# Patient Record
Sex: MALE | NOT HISPANIC OR LATINO | URBAN - METROPOLITAN AREA
[De-identification: names, ages, dates, MRNs, and addresses within clinical notes are randomized per-mention and may not be internally consistent; named-entity substitution may affect disease eponyms.]

---

## 2017-12-22 ENCOUNTER — INPATIENT (INPATIENT)
Facility: HOSPITAL | Age: 22
LOS: 1 days | Discharge: HOME | DRG: 810 | End: 2017-12-24
Attending: HOSPITALIST | Admitting: HOSPITALIST
Payer: COMMERCIAL

## 2017-12-22 VITALS
HEART RATE: 102 BPM | OXYGEN SATURATION: 98 % | SYSTOLIC BLOOD PRESSURE: 137 MMHG | DIASTOLIC BLOOD PRESSURE: 81 MMHG | TEMPERATURE: 100 F | WEIGHT: 222.01 LBS | RESPIRATION RATE: 20 BRPM

## 2017-12-22 DIAGNOSIS — D59.1 OTHER AUTOIMMUNE HEMOLYTIC ANEMIAS: ICD-10-CM

## 2017-12-22 DIAGNOSIS — Z29.9 ENCOUNTER FOR PROPHYLACTIC MEASURES, UNSPECIFIED: ICD-10-CM

## 2017-12-22 DIAGNOSIS — D58.9 HEREDITARY HEMOLYTIC ANEMIA, UNSPECIFIED: ICD-10-CM

## 2017-12-22 LAB
ALBUMIN SERPL ELPH-MCNC: 4.8 G/DL — SIGNIFICANT CHANGE UP (ref 3.3–5)
ALP SERPL-CCNC: 68 U/L — SIGNIFICANT CHANGE UP (ref 40–120)
ALT FLD-CCNC: 43 U/L RC — SIGNIFICANT CHANGE UP (ref 10–45)
ANION GAP SERPL CALC-SCNC: 11 MMOL/L — SIGNIFICANT CHANGE UP (ref 5–17)
ANISOCYTOSIS BLD QL: SIGNIFICANT CHANGE UP
ANTIBODY ID 1_1: SIGNIFICANT CHANGE UP
AST SERPL-CCNC: 63 U/L — HIGH (ref 10–40)
BASOPHILS # BLD AUTO: 0.1 K/UL — SIGNIFICANT CHANGE UP (ref 0–0.2)
BILIRUB DIRECT SERPL-MCNC: 0.3 MG/DL — HIGH (ref 0–0.2)
BILIRUB INDIRECT FLD-MCNC: 4.6 MG/DL — HIGH (ref 0.2–1)
BILIRUB SERPL-MCNC: 4.9 MG/DL — HIGH (ref 0.2–1.2)
BILIRUB SERPL-MCNC: 4.9 MG/DL — HIGH (ref 0.2–1.2)
BLD GP AB SCN SERPL QL: POSITIVE — SIGNIFICANT CHANGE UP
BLD GP AB SCN SERPL QL: POSITIVE — SIGNIFICANT CHANGE UP
BUN SERPL-MCNC: 16 MG/DL — SIGNIFICANT CHANGE UP (ref 7–23)
CALCIUM SERPL-MCNC: 9 MG/DL — SIGNIFICANT CHANGE UP (ref 8.4–10.5)
CHLORIDE SERPL-SCNC: 104 MMOL/L — SIGNIFICANT CHANGE UP (ref 96–108)
CO2 SERPL-SCNC: 26 MMOL/L — SIGNIFICANT CHANGE UP (ref 22–31)
CREAT SERPL-MCNC: 1.13 MG/DL — SIGNIFICANT CHANGE UP (ref 0.5–1.3)
EOSINOPHIL # BLD AUTO: 0.3 K/UL — SIGNIFICANT CHANGE UP (ref 0–0.5)
EOSINOPHIL NFR BLD AUTO: 3 % — SIGNIFICANT CHANGE UP (ref 0–6)
FOLATE SERPL-MCNC: >20 NG/ML — SIGNIFICANT CHANGE UP (ref 4.8–24.2)
GIANT PLATELETS BLD QL SMEAR: PRESENT — SIGNIFICANT CHANGE UP
GLUCOSE SERPL-MCNC: 108 MG/DL — HIGH (ref 70–99)
HAPTOGLOB SERPL-MCNC: <20 MG/DL — LOW (ref 34–200)
HCT VFR BLD CALC: 18.5 % — CRITICAL LOW (ref 39–50)
HGB BLD-MCNC: 6.6 G/DL — CRITICAL LOW (ref 13–17)
HYPOCHROMIA BLD QL: SLIGHT — SIGNIFICANT CHANGE UP
LDH SERPL L TO P-CCNC: 827 U/L — HIGH (ref 50–242)
LG PLATELETS BLD QL AUTO: SLIGHT — SIGNIFICANT CHANGE UP
LYMPHOCYTES # BLD AUTO: 16 % — SIGNIFICANT CHANGE UP (ref 13–44)
LYMPHOCYTES # BLD AUTO: 2.1 K/UL — SIGNIFICANT CHANGE UP (ref 1–3.3)
MACROCYTES BLD QL: SLIGHT — SIGNIFICANT CHANGE UP
MCHC RBC-ENTMCNC: 35.6 GM/DL — SIGNIFICANT CHANGE UP (ref 32–36)
MCHC RBC-ENTMCNC: 36.6 PG — HIGH (ref 27–34)
MCV RBC AUTO: 103 FL — HIGH (ref 80–100)
MICROCYTES BLD QL: SLIGHT — SIGNIFICANT CHANGE UP
MONOCYTES # BLD AUTO: 0.7 K/UL — SIGNIFICANT CHANGE UP (ref 0–0.9)
MONOCYTES NFR BLD AUTO: 5 % — SIGNIFICANT CHANGE UP (ref 2–14)
NEUTROPHILS # BLD AUTO: 9.3 K/UL — HIGH (ref 1.8–7.4)
NEUTROPHILS NFR BLD AUTO: 76 % — SIGNIFICANT CHANGE UP (ref 43–77)
NRBC # BLD: 5 /100 — HIGH (ref 0–0)
PLAT MORPH BLD: ABNORMAL
PLATELET # BLD AUTO: 252 K/UL — SIGNIFICANT CHANGE UP (ref 150–400)
POLYCHROMASIA BLD QL SMEAR: SIGNIFICANT CHANGE UP
POTASSIUM SERPL-MCNC: 3.9 MMOL/L — SIGNIFICANT CHANGE UP (ref 3.5–5.3)
POTASSIUM SERPL-SCNC: 3.9 MMOL/L — SIGNIFICANT CHANGE UP (ref 3.5–5.3)
PROT SERPL-MCNC: 7.4 G/DL — SIGNIFICANT CHANGE UP (ref 6–8.3)
RBC # BLD: 1.8 M/UL — LOW (ref 4.2–5.8)
RBC # BLD: 1.8 M/UL — LOW (ref 4.2–5.8)
RBC # FLD: 23.8 % — HIGH (ref 10.3–14.5)
RBC BLD AUTO: ABNORMAL
RETICS #: 825 K/UL — HIGH (ref 25–125)
RETICS/RBC NFR: 45.8 % — HIGH (ref 0.5–2.5)
RH IG SCN BLD-IMP: NEGATIVE — SIGNIFICANT CHANGE UP
RH IG SCN BLD-IMP: NEGATIVE — SIGNIFICANT CHANGE UP
SODIUM SERPL-SCNC: 141 MMOL/L — SIGNIFICANT CHANGE UP (ref 135–145)
WBC # BLD: 11.7 K/UL — HIGH (ref 3.8–10.5)
WBC # FLD AUTO: 11.7 K/UL — HIGH (ref 3.8–10.5)

## 2017-12-22 PROCEDURE — 71010: CPT | Mod: 26

## 2017-12-22 PROCEDURE — 86077 PHYS BLOOD BANK SERV XMATCH: CPT

## 2017-12-22 PROCEDURE — 99255 IP/OBS CONSLTJ NEW/EST HI 80: CPT | Mod: GC

## 2017-12-22 PROCEDURE — 99285 EMERGENCY DEPT VISIT HI MDM: CPT

## 2017-12-22 PROCEDURE — 99223 1ST HOSP IP/OBS HIGH 75: CPT | Mod: GC

## 2017-12-22 RX ADMIN — Medication 100 MILLIGRAM(S): at 17:28

## 2017-12-22 NOTE — CONSULT NOTE ADULT - SUBJECTIVE AND OBJECTIVE BOX
HPI:  22 M no PMH sent to ED by PMD with concerns for hemolytic anemia.  Patient reports 1 week fatigue, ARDON.  Saw primary physician and labs revealed anemia, with indirect hyperbilirubinemia and was sent to ED.  He denies any recent illness, new medications, dietary restrictions, blood disorder in past.  No significant family history of anemia.  He takes a multivitamin, denies any herbal supplementation aside from a multivitamin.  Denies any recent travel or exposures.         Allergies    No Known Allergies    Intolerances        MEDICATIONS  (STANDING):    MEDICATIONS  (PRN):      PAST MEDICAL & SURGICAL HISTORY:  No pertinent past medical history  No significant past surgical history      FAMILY HISTORY:      SOCIAL HISTORY: No EtOH, no tobacco    REVIEW OF SYSTEMS:    CONSTITUTIONAL: No weakness, fevers or chills  EYES/ENT: No visual changes;  No vertigo or throat pain   NECK: No pain or stiffness  RESPIRATORY: No cough, wheezing, hemoptysis; No shortness of breath  CARDIOVASCULAR: No chest pain or palpitations  GASTROINTESTINAL: No abdominal or epigastric pain. No nausea, vomiting, or hematemesis; No diarrhea or constipation. No melena or hematochezia.  GENITOURINARY: No dysuria, frequency or hematuria  NEUROLOGICAL: No numbness or weakness  SKIN: No itching, burning, rashes, or lesions   All other review of systems is negative unless indicated above.      Weight (kg): 100.7 (12-22 @ 12:34)    T(F): 99.8 (12-22-17 @ 12:34), Max: 99.8 (12-22-17 @ 12:34)  HR: 102 (12-22-17 @ 12:34)  BP: 137/81 (12-22-17 @ 12:34)  RR: 20 (12-22-17 @ 12:34)  SpO2: 98% (12-22-17 @ 12:34)  Wt(kg): --    GENERAL: NAD, well-developed, + jaundice  HEAD:  Atraumatic, Normocephalic  EYES: EOMI, PERRLA, mild scleral icterus  NECK: Supple, No JVD  CHEST/LUNG: Clear to auscultation bilaterally; No wheeze  HEART: Regular rate and rhythm; No murmurs, rubs, or gallops  ABDOMEN: Soft, Nontender, Nondistended; Bowel sounds present  EXTREMITIES:  2+ Peripheral Pulses, No clubbing, cyanosis, or edema  NEUROLOGY: non-focal  SKIN: No rashes or lesions                          6.6    11.7  )-----------( 252      ( 22 Dec 2017 13:36 )             18.5       12-22    141  |  104  |  16  ----------------------------<  108<H>  3.9   |  26  |  1.13    Ca    9.0      22 Dec 2017 13:36    TPro  7.4  /  Alb  4.8  /  TBili  4.9<H>  /  DBili  0.3<H>  /  AST  63<H>  /  ALT  43  /  AlkPhos  68  12-22      Lactate Dehydrogenase, Serum: 827 U/L (12-22 @ 13:36) HPI:  22 M no PMH sent to ED by PMD with concerns for hemolytic anemia.  Patient reports 1 week fatigue, ARDON.  Saw primary physician and labs revealed anemia, with indirect hyperbilirubinemia and was sent to ED.  He denies any recent illness, new medications, dietary restrictions, blood disorder in past.  No significant family history of anemia.  He takes a multivitamin, denies any herbal supplementation aside from a multivitamin.  Denies any recent travel or exposures.         Allergies    No Known Allergies    Intolerances        MEDICATIONS  (STANDING):    MEDICATIONS  (PRN):      PAST MEDICAL & SURGICAL HISTORY:  No pertinent past medical history  No significant past surgical history      FAMILY HISTORY:      SOCIAL HISTORY: No EtOH, no tobacco    REVIEW OF SYSTEMS:    CONSTITUTIONAL: No weakness, fevers or chills  EYES/ENT: No visual changes;  No vertigo or throat pain   NECK: No pain or stiffness  RESPIRATORY: No cough, wheezing, hemoptysis; No shortness of breath  CARDIOVASCULAR: No chest pain or palpitations  GASTROINTESTINAL: No abdominal or epigastric pain. No nausea, vomiting, or hematemesis; No diarrhea or constipation. No melena or hematochezia.  GENITOURINARY: No dysuria, frequency or hematuria  NEUROLOGICAL: No numbness or weakness  SKIN: No itching, burning, rashes, or lesions   All other review of systems is negative unless indicated above.      Weight (kg): 100.7 (12-22 @ 12:34)    T(F): 99.8 (12-22-17 @ 12:34), Max: 99.8 (12-22-17 @ 12:34)  HR: 102 (12-22-17 @ 12:34)  BP: 137/81 (12-22-17 @ 12:34)  RR: 20 (12-22-17 @ 12:34)  SpO2: 98% (12-22-17 @ 12:34)  Wt(kg): --    GENERAL: NAD, well-developed, + jaundice  HEAD:  Atraumatic, Normocephalic  EYES: EOMI, PERRLA, mild scleral icterus  NECK: Supple, No JVD  CHEST/LUNG: Clear to auscultation bilaterally; No wheeze  HEART: Regular rate and rhythm; No murmurs, rubs, or gallops  ABDOMEN: Soft, Nontender, Nondistended; Bowel sounds present  EXTREMITIES:  2+ Peripheral Pulses, No clubbing, cyanosis, or edema  NEUROLOGY: non-focal  SKIN: No rashes or lesions                          6.6    11.7  )-----------( 252      ( 22 Dec 2017 13:36 )             18.5       12-22    141  |  104  |  16  ----------------------------<  108<H>  3.9   |  26  |  1.13    Ca    9.0      22 Dec 2017 13:36    TPro  7.4  /  Alb  4.8  /  TBili  4.9<H>  /  DBili  0.3<H>  /  AST  63<H>  /  ALT  43  /  AlkPhos  68  12-22      Lactate Dehydrogenase, Serum: 827 U/L (12-22 @ 13:36)    Direct Saeid IgG (12.22.17 @ 16:36)    Direct Saeid IgG: Positive

## 2017-12-22 NOTE — ED PROVIDER NOTE - PROGRESS NOTE DETAILS
GRETA Winchester MD: Pt seen and eval by Hematology who recommends 1mg/kg prednisone/day at this time. They also recommend admission overnight for further observation. Further recs to follow by Hematology.

## 2017-12-22 NOTE — ED PROVIDER NOTE - MEDICAL DECISION MAKING DETAILS
GRETA Winchester MD: Pt is a 23 y/o male with no sig PMH who is sent in by PMD for anemia and hyperbilirubinemia. Per patient, he began to develop dyspnea on exertion 1 week ago, and 5 days ago his family and girlfriend noted that his skin and eyes looked yellow. Saw PMD who sent labs concerning for hemolytic anemia (elevated LDH, indirect hyperbilirubinemia, Hgb~6) without reduction to WBC or PLT. Per patient, his mother has MS, but he does not know of other autoimmune or hematologic d/o in the family. Denies: CP, cough, F/C, abd pain, n/v/d, change in color of urine or stools, easy bruising/bleeding, swelling to groin/neck or other parts of body.  DDx: warm/cold Ab autoimmune hemolytic anemia, G6PD, other cause of hemolytic anemia, malignancy  Plan: basic labs, LDH, haptoglobin, marybeth test, type and screen, Hematology consult

## 2017-12-22 NOTE — CONSULT NOTE ADULT - PROBLEM SELECTOR RECOMMENDATION 9
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  Discussed with ED physician  - Would not transfuse pRBC unless symptomatic at rest  - Trend CBC, hemolysis labs, retic count daily.

## 2017-12-22 NOTE — ED ADULT NURSE NOTE - CHPI ED SYMPTOMS NEG
no dizziness/no weakness/no fever/no nausea/no tingling/no decreased eating/drinking/no vomiting/no numbness/no pain/no chills

## 2017-12-22 NOTE — ED ADULT NURSE NOTE - OBJECTIVE STATEMENT
21 y/o male no PMH presents to the ED c/o jaundice x few days. Pt was sent from PCP for jaundice and palpitations, and anemia. Pt denies any symptoms- no n/v, abdominal pain, fever, chills, diarrhea/constipation. Pt A&Ox3, in no respiratory distress, no CP, abdomen soft, nontender, nondistended, resting comfortably with safety maintained.

## 2017-12-22 NOTE — H&P ADULT - PROBLEM SELECTOR PLAN 1
- Appears extravascular hemolysis 2/2 warm hemolytic anemia, strong confirmation with elevated LDH, low haptoglobin, elevated reticulocyte count, and + Direct Saeid test. Negative C3, which shows no complement involved.  - Etiology may be 2/2 underlying EBV from past mononucleosis infection. HIV can cause this as well. Would f/u HIV and EBV levels. No spherocytes or schistocytes/thrombocytopenia, which r/o hereditary spherocytosis and TTP as causes, respectively.  - With macrocytic anemia. Folate nml, but would check B12 as well  - Recheck UA to look for heme in urine  - Appreciate heme recs, Prednisone 1mg/kg QD  - Would f/u CXR to ensure no other etiologies for SOB - Appears extravascular hemolysis 2/2 warm hemolytic anemia, strong confirmation with elevated LDH, low haptoglobin, elevated reticulocyte count, and + Direct Saeid test. Negative C3, which shows no complement involved.  - Etiology may be 2/2 underlying EBV from past mononucleosis infection. HIV can cause this as well. Would f/u HIV and EBV levels. No spherocytes or schistocytes/thrombocytopenia, which r/o hereditary spherocytosis and TTP as causes, respectively.  - Can be caused by CLL or SLE but no reason to suspect at this juncture  - With macrocytic anemia. Folate nml, but would check B12 as well  - Recheck UA to look for heme in urine  - Appreciate heme recs, Prednisone 1mg/kg QD.  - trend Hb to > 10. If Hb not improving, would likely need to add Rituximab. No need to add folic acid at this time, as levels are normal  - Would f/u CXR to ensure no other etiologies for SOB - Appears extravascular hemolysis 2/2 warm hemolytic anemia, strong confirmation with elevated LDH, low haptoglobin, elevated reticulocyte count, and + Direct Saeid test. Negative C3, which shows no complement involved.  - Etiology may be 2/2 underlying EBV from past mononucleosis infection. HIV can cause this as well. Would f/u HIV and EBV levels. No spherocytes or schistocytes/thrombocytopenia, which r/o hereditary spherocytosis and TTP as causes, respectively.  - Can be caused by CLL or SLE, would check auto-iimune w/u with dsDNA, JACK, and complement  - With macrocytic anemia. Folate nml, but would check B12 as well  - Recheck UA to look for heme in urine  - Appreciate heme recs, Prednisone 1mg/kg QD.  - trend Hb to > 10. If Hb not improving, would likely need to add Rituximab. No need to add folic acid at this time, as levels are normal  - Would f/u CXR to ensure no other etiologies for SOB

## 2017-12-22 NOTE — H&P ADULT - HISTORY OF PRESENT ILLNESS
21 yo M with no PMH presents sent from PCP with jaundice and ARDON. He went to his PCP because he had been having scleral icterus and jaundice for the past week, along with SOB and palpitations when he would walk a flight of stairs. The SOB/palpitations would resolve after 20 minutes of rest. He denies chest pain, resting SOB, cough, orthopnea, fevers, chills, dizziness, blurry vision, sick contacts, or recent travel. He does note a slight headache, in his "sinuses." No weight gain or weight loss. No nausea, vomiting, or abdominal pain, no family history of malignancy, and no prior surgeries. Had mononucleosis about 6 years ago requiring hospitalization. His PCP sent him to the ED for labwork concerning for hemolytic anemia.    In the ED, he was given Prednisone 100 PO x1.

## 2017-12-22 NOTE — ED PROVIDER NOTE - OBJECTIVE STATEMENT
21yo m w/o pmh p/w jaundice and anemia, sent from PMD for w/u. Had labs yesterday at PMD. Pt was initially seen for palpitations on exertion and jaundice. Denies SOB. No known family hx of hemolytic anemia. No abdominal pain.

## 2017-12-22 NOTE — CONSULT NOTE ADULT - ATTENDING COMMENTS
This is a young gentleman who has evidence of a warm hemolytic anemia.  His LDH/indirect bilirubin is elevated, haptoglobin is low with an appropriate increased reticulocyte count.  Saeid test positive IgG.  Prior to this episode he had been feeling well and has had no constitutional symptoms.  On exam there is no evidence of lymphadenopathy or splenomegaly.  He is asymptomatic at rest, only has dyspnea with exertion.   Recommend beginning prednisone 1mg/kg daily.  Hold off on a transfusion unless his hemoglobin drops further.  Please follow reticulocyte count, CBC, LDH, bilirubin daily.  Would also check quantitative immunoglobulins, autoimmune workup for now.

## 2017-12-22 NOTE — H&P ADULT - NSHPPHYSICALEXAM_GEN_ALL_CORE
ED VS: Temp 99.8 --> 98F,  --> 82, /81 --> 123/75, RR 20 --> 18, O2 98 --> 100% on RA.    GENERAL: No acute distress  HEENT: mild scleral icterus, PERRL, EOMI, MMM, no oropharyngeal lesions  NECK: supple, no stiffness, no JVD, no thyromegaly  PULM: respirations non-labored, clear to auscultation bilaterally, no rales, rhonchi, or wheezes  CV: II/VI RSB systolic murmur. Regular rate and rhythm. No gallops or rubs  GI: abdomen soft, nontender, nondistended, no masses felt, normal bowel sounds  : no genital lesions or ulcers  MSK: strength 5/5 bilateral upper/lower extremities. No joint swelling, erythema, or warmth.  LYMPH: no anterior cervical, posterior cervical, supraclavicular, or inguinal lymphadenopathy  NEURO: A&Ox3, no tremors, sensation intact  SKIN: jaundice. No rashes, lesions, or edema

## 2017-12-22 NOTE — H&P ADULT - NSHPLABSRESULTS_GEN_ALL_CORE
Labs personally reviewed and interpreted. Notable for mild leukocytosis (WBC 11.7 with increased neutrophil count), Hb 6.6 (no baseline) with . Plts 252. 45% reticulocyte count.  , haptoglobin < 20. Creatinine 1.13, BUN 16. Tbili 4.9 (indirect bili 4.6). AST slightly elevated at 63. ALT, AP, albumin all wnl. Direct Saeid antibody +, IgG+, C3-.    No imaging performed as of yet.    EKG from 12/21 (PCP office) personally reviewed. Normal sinus rhtyhm, normal axis. Rate 86, , QTc 373.  No pathological Q waves or ST changes. Labs personally reviewed and interpreted. Notable for mild leukocytosis (WBC 11.7 with increased neutrophil count), Hb 6.6 (no baseline) with . Plts 252. 45% reticulocyte count.  , haptoglobin < 20. Creatinine 1.13, BUN 16. Tbili 4.9 (indirect bili 4.6). AST slightly elevated at 63. ALT, AP, albumin all wnl. Direct Saeid antibody +, IgG+, C3-.  UA outpatient pH 5.5, SG 1.018, trace ketones, no blood, neg bilirubin, 1.0 urobilinogen, neg LE/N. 1 WBC, 2 RBCs.    No imaging performed as of yet.    EKG from 12/21 (PCP office) personally reviewed. Normal sinus rhtyhm, normal axis. Rate 86, , QTc 373.  No pathological Q waves or ST changes. Labs, imaging and tracing personally reviewed and interpreted. Notable for mild leukocytosis (WBC 11.7 with increased neutrophil count), Hb 6.6 (no baseline) with . Plts 252. 45% reticulocyte count.  , haptoglobin < 20. Creatinine 1.13, BUN 16. Tbili 4.9 (indirect bili 4.6). AST slightly elevated at 63. ALT, AP, albumin all wnl. Direct Saeid antibody +, IgG+, C3-.  UA outpatient pH 5.5, SG 1.018, trace ketones, no blood, neg bilirubin, 1.0 urobilinogen, neg LE/N. 1 WBC, 2 RBCs.    No imaging performed as of yet.    EKG from 12/21 (PCP office) personally reviewed. Normal sinus rhtyhm, normal axis. Rate 86, , QTc 373.  No pathological Q waves or ST changes.

## 2017-12-22 NOTE — H&P ADULT - FAMILY HISTORY
Mother  Still living? Unknown  Family history of MS (multiple sclerosis), Age at diagnosis: Age Unknown

## 2017-12-22 NOTE — H&P ADULT - NSHPREVIEWOFSYSTEMS_GEN_ALL_CORE
Gen: negative for fevers, chills, anorexia, weight loss, weight gain, malaise, fatigue  Eyes: no blurred vision or lacrimation  ENT: no tinnitus, vertigo, or difficulty hearing  Resp: no wheezing, dyspnea, pleuritic chest pain, hemoptysis, or orthopnea  CV: + dyspnea on exertion, palpitations. No chest pain  GI: no nausea, vomiting, abdominal pain, diarrhea, constipation, melena, or hematochezia  : no dysuria, hematuria, discharge, or incontinence  MSK: no arthralgias, joint stiffness, joint swelling, or myalgias  Neuro: no focal deficits, confusion, weakness, dizziness, tremors, or seizures  Skin: + jaundice. No rash, lesions, or edema

## 2017-12-22 NOTE — H&P ADULT - NSHPSOCIALHISTORY_GEN_ALL_CORE
BioScience major at [employer]  No smoking history.  Alcohol use 1-2 drinks per month.  No drug history.  Sexually active with one female partner, uses barrier protection.

## 2017-12-23 ENCOUNTER — TRANSCRIPTION ENCOUNTER (OUTPATIENT)
Age: 22
End: 2017-12-23

## 2017-12-23 DIAGNOSIS — R06.09 OTHER FORMS OF DYSPNEA: ICD-10-CM

## 2017-12-23 LAB
ALBUMIN SERPL ELPH-MCNC: 4.7 G/DL — SIGNIFICANT CHANGE UP (ref 3.3–5)
ALP SERPL-CCNC: 73 U/L — SIGNIFICANT CHANGE UP (ref 40–120)
ALT FLD-CCNC: 44 U/L RC — SIGNIFICANT CHANGE UP (ref 10–45)
ANION GAP SERPL CALC-SCNC: 11 MMOL/L — SIGNIFICANT CHANGE UP (ref 5–17)
APPEARANCE UR: ABNORMAL
AST SERPL-CCNC: 60 U/L — HIGH (ref 10–40)
BILIRUB SERPL-MCNC: 4.8 MG/DL — HIGH (ref 0.2–1.2)
BILIRUB UR-MCNC: NEGATIVE — SIGNIFICANT CHANGE UP
BUN SERPL-MCNC: 17 MG/DL — SIGNIFICANT CHANGE UP (ref 7–23)
C3 SERPL-MCNC: 116 MG/DL — SIGNIFICANT CHANGE UP (ref 80–180)
C4 SERPL-MCNC: 18 MG/DL — SIGNIFICANT CHANGE UP (ref 10–45)
CALCIUM SERPL-MCNC: 9.4 MG/DL — SIGNIFICANT CHANGE UP (ref 8.4–10.5)
CHLORIDE SERPL-SCNC: 103 MMOL/L — SIGNIFICANT CHANGE UP (ref 96–108)
CO2 SERPL-SCNC: 26 MMOL/L — SIGNIFICANT CHANGE UP (ref 22–31)
COLOR SPEC: YELLOW — SIGNIFICANT CHANGE UP
CREAT SERPL-MCNC: 1.07 MG/DL — SIGNIFICANT CHANGE UP (ref 0.5–1.3)
DIFF PNL FLD: ABNORMAL
FERRITIN SERPL-MCNC: 667 NG/ML — HIGH (ref 30–400)
GLUCOSE BLDC GLUCOMTR-MCNC: 142 MG/DL — HIGH (ref 70–99)
GLUCOSE BLDC GLUCOMTR-MCNC: 145 MG/DL — HIGH (ref 70–99)
GLUCOSE BLDC GLUCOMTR-MCNC: 177 MG/DL — HIGH (ref 70–99)
GLUCOSE BLDC GLUCOMTR-MCNC: 188 MG/DL — HIGH (ref 70–99)
GLUCOSE SERPL-MCNC: 163 MG/DL — HIGH (ref 70–99)
GLUCOSE UR QL: NEGATIVE — SIGNIFICANT CHANGE UP
HAPTOGLOB SERPL-MCNC: <20 MG/DL — LOW (ref 34–200)
HCT VFR BLD CALC: SIGNIFICANT CHANGE UP % (ref 39–50)
HGB BLD-MCNC: 6.6 G/DL — CRITICAL LOW (ref 13–17)
HIV 1+2 AB+HIV1 P24 AG SERPL QL IA: SIGNIFICANT CHANGE UP
IRON SATN MFR SERPL: 175 UG/DL — HIGH (ref 45–165)
IRON SATN MFR SERPL: 80 % — HIGH (ref 16–55)
KETONES UR-MCNC: NEGATIVE — SIGNIFICANT CHANGE UP
LDH SERPL L TO P-CCNC: 878 U/L — HIGH (ref 50–242)
LEUKOCYTE ESTERASE UR-ACNC: NEGATIVE — SIGNIFICANT CHANGE UP
MAGNESIUM SERPL-MCNC: 2.4 MG/DL — SIGNIFICANT CHANGE UP (ref 1.6–2.6)
MCHC RBC-ENTMCNC: SIGNIFICANT CHANGE UP GM/DL (ref 32–36)
MCHC RBC-ENTMCNC: SIGNIFICANT CHANGE UP PG (ref 27–34)
MCV RBC AUTO: SIGNIFICANT CHANGE UP FL (ref 80–100)
NITRITE UR-MCNC: NEGATIVE — SIGNIFICANT CHANGE UP
PH UR: 6.5 — SIGNIFICANT CHANGE UP (ref 5–8)
PHOSPHATE SERPL-MCNC: 4.2 MG/DL — SIGNIFICANT CHANGE UP (ref 2.5–4.5)
PLATELET # BLD AUTO: 288 K/UL — SIGNIFICANT CHANGE UP (ref 150–400)
POTASSIUM SERPL-MCNC: 4.6 MMOL/L — SIGNIFICANT CHANGE UP (ref 3.5–5.3)
POTASSIUM SERPL-SCNC: 4.6 MMOL/L — SIGNIFICANT CHANGE UP (ref 3.5–5.3)
PROT SERPL-MCNC: 7.6 G/DL — SIGNIFICANT CHANGE UP (ref 6–8.3)
PROT UR-MCNC: SIGNIFICANT CHANGE UP
RBC # BLD: SIGNIFICANT CHANGE UP M/UL (ref 4.2–5.8)
RBC # BLD: SIGNIFICANT CHANGE UP M/UL (ref 4.2–5.8)
RBC # FLD: SIGNIFICANT CHANGE UP % (ref 10.3–14.5)
RETICS #: 768 K/UL — HIGH (ref 25–125)
RETICS/RBC NFR: 48.9 % — HIGH (ref 0.5–2.5)
SODIUM SERPL-SCNC: 140 MMOL/L — SIGNIFICANT CHANGE UP (ref 135–145)
SP GR SPEC: 1.02 — SIGNIFICANT CHANGE UP (ref 1.01–1.02)
TIBC SERPL-MCNC: 220 UG/DL — SIGNIFICANT CHANGE UP (ref 220–430)
UIBC SERPL-MCNC: 45 UG/DL — LOW (ref 110–370)
UROBILINOGEN FLD QL: 2
VIT B12 SERPL-MCNC: 534 PG/ML — SIGNIFICANT CHANGE UP (ref 232–1245)
WBC # BLD: 15.8 K/UL — HIGH (ref 3.8–10.5)
WBC # FLD AUTO: 15.8 K/UL — HIGH (ref 3.8–10.5)

## 2017-12-23 PROCEDURE — 99232 SBSQ HOSP IP/OBS MODERATE 35: CPT

## 2017-12-23 PROCEDURE — 99233 SBSQ HOSP IP/OBS HIGH 50: CPT | Mod: GC

## 2017-12-23 RX ORDER — INSULIN LISPRO 100/ML
VIAL (ML) SUBCUTANEOUS
Qty: 0 | Refills: 0 | Status: DISCONTINUED | OUTPATIENT
Start: 2017-12-23 | End: 2017-12-24

## 2017-12-23 RX ORDER — INSULIN LISPRO 100/ML
VIAL (ML) SUBCUTANEOUS AT BEDTIME
Qty: 0 | Refills: 0 | Status: DISCONTINUED | OUTPATIENT
Start: 2017-12-23 | End: 2017-12-24

## 2017-12-23 RX ADMIN — Medication 1: at 12:40

## 2017-12-23 RX ADMIN — Medication 1: at 18:16

## 2017-12-23 RX ADMIN — Medication 1 TABLET(S): at 12:40

## 2017-12-23 RX ADMIN — Medication 100 MILLIGRAM(S): at 07:02

## 2017-12-23 NOTE — DISCHARGE NOTE ADULT - HOSPITAL COURSE
22 year old man with no PMH presents sent from PCP with jaundice and ARDON. Labs on admission showed Hgb 6.6, elevated LDH, elevated reticulocyte, elevated bilirubin, positive Saeid with warm antibodies, and leukocytosis. Patient was dx with autoimmune hemolytic anemia. Hematology was consulted and recommended a steroid taper. 22 year old man with no PMH presents sent from PCP with jaundice and ARDON. Labs on admission showed Hgb 6.6, elevated LDH, elevated reticulocyte, elevated bilirubin, positive Saeid with warm antibodies, and leukocytosis. Patient was dx with autoimmune hemolytic anemia. Hematology was consulted and started on prednisone. As per heme-onc, patient to continue prednisone till sees heme-onc outpatient.

## 2017-12-23 NOTE — PROGRESS NOTE ADULT - PROBLEM SELECTOR PLAN 1
Warm hemolytic anemia on Prednisone 1 mg/kg x 2 doses.   Continue monitoring daily LDH and hapto, CBC   work up for underlying causes in progress  If Hb increases above 7 and LDH decreases and hapto increases and pt's symptoms improve, can discharge pt with hematology follow up

## 2017-12-23 NOTE — PROGRESS NOTE ADULT - SUBJECTIVE AND OBJECTIVE BOX
Patient is a 22y old  Male who presents with a chief complaint of jaundice and ARDON (22 Dec 2017 20:39)      SUBJECTIVE / OVERNIGHT EVENTS: No overnight events. Tolerating PO. States he has been having a headache. HE is having SOB and palpitation taking a flight of stairs.   Patient denies chest pain, SOB at rest, palpitations at rest, abdominal pain, nausea, vomiting, chills, and cough.    MEDICATIONS  (STANDING):  insulin lispro (HumaLOG) corrective regimen sliding scale   SubCutaneous three times a day before meals  insulin lispro (HumaLOG) corrective regimen sliding scale   SubCutaneous at bedtime  multivitamin 1 Tablet(s) Oral daily  predniSONE   Tablet 100 milliGRAM(s) Oral daily    MEDICATIONS  (PRN):      T(F): 97.9 ( @ 05:27), Max: 99.2 ( @ 17:34)  HR: 91 ( @ 05:27) (82 - 98)  BP: 111/69 ( @ 05:27) (111/69 - 135/79)  RR: 18 ( @ 05:27) (18 - 20)  SpO2: 100% ( @ 05:27) (98% - 100%)  CAPILLARY BLOOD GLUCOSE      POCT Blood Glucose.: 188 mg/dL (23 Dec 2017 12:29)  POCT Blood Glucose.: 145 mg/dL (23 Dec 2017 08:31)    I&O's Summary      PHYSICAL EXAM:  GEN: Appears in no acute distress, jaundice  HEENT: PERRLA, EOMI and accommodate, neck supple, no lymphadenopathy, no JVD, scleral icterus  MOUTH: moist mucous membranes, no exudates or lesions, jaundice appreciated under tongue  PULM: Clear to auscultation bilaterally, no wheezes, rales, rhonchi  CV: RRR, S1S2, no murmurs, rubs or gallops  Abdominal: Soft, nontender to palpation, non-distended, normoactive bowel sounds, no HSM  Extremities: WWP, No edema, + peripheral pulses  NEURO: AAOx3, moving all four extremities spontaneously  Skin: No rashes, lesions, hematomas, ecchymosis      LABS:  Labs personally reviewed.                        6.6    15.8  )-----------( 288      ( 23 Dec 2017 05:22 )             SEE NOTE    Hgb Trend: 6.6<--, 6.6<--      140  |  103  |  17  ----------------------------<  163<H>  4.6   |  26  |  1.07    Ca    9.4      23 Dec 2017 05:23  Phos  4.2       Mg     2.4         TPro  7.6  /  Alb  4.7  /  TBili  4.8<H>  /  DBili  x   /  AST  60<H>  /  ALT  44  /  AlkPhos  73      Creatinine Trend: 1.07<--, 1.13<--    Urinalysis Basic - ( 23 Dec 2017 05:48 )    Color: Yellow / Appearance: SL Turbid / S.018 / pH: x  Gluc: x / Ketone: Negative  / Bili: Negative / Urobili: 2   Blood: x / Protein: Trace / Nitrite: Negative   Leuk Esterase: Negative / RBC: 2-5 /HPF / WBC x   Sq Epi: x / Non Sq Epi: x / Bacteria: x        RADIOLOGY & ADDITIONAL TESTS:  Imaging Personally Reviewed.    Consultants: Hematology       Mario Weller PGY-1 Pager: HARSHA [phone]/ [phone]  Night Float: HARSHA [id number]/ NS

## 2017-12-23 NOTE — DISCHARGE NOTE ADULT - PATIENT PORTAL LINK FT
“You can access the Follow[facility] Patient Portal, offered by [facility], by registering with the following website: [email or web address]”

## 2017-12-23 NOTE — PROGRESS NOTE ADULT - SUBJECTIVE AND OBJECTIVE BOX
Chief Complaint: warm hemolytic anemia     INTERVAL HPI/OVERNIGHT EVENTS: Feels ok. Unchanged ARDON. No SOB at rest. Denies any other complaints     MEDICATIONS  (STANDING):  insulin lispro (HumaLOG) corrective regimen sliding scale   SubCutaneous three times a day before meals  insulin lispro (HumaLOG) corrective regimen sliding scale   SubCutaneous at bedtime  multivitamin 1 Tablet(s) Oral daily  predniSONE   Tablet 100 milliGRAM(s) Oral daily    MEDICATIONS  (PRN):      Allergies    No Known Allergies    Intolerances        ROS: as above     Vital Signs Last 24 Hrs  T(C): 36.9 (23 Dec 2017 13:53), Max: 37.3 (22 Dec 2017 17:34)  T(F): 98.4 (23 Dec 2017 13:53), Max: 99.2 (22 Dec 2017 17:34)  HR: 87 (23 Dec 2017 13:53) (82 - 98)  BP: 115/74 (23 Dec 2017 13:53) (111/69 - 135/79)  BP(mean): --  RR: 18 (23 Dec 2017 13:53) (18 - 20)  SpO2: 97% (23 Dec 2017 13:53) (97% - 100%)    Physical Exam:   AAO x 3, NAD   RRR S1S2  CTA b/l   soft NTNDBS+  no c/c/e    LABS:                        6.6    15.8  )-----------( 288      ( 23 Dec 2017 05:22 )             SEE NOTE        140  |  103  |  17  ----------------------------<  163<H>  4.6   |  26  |  1.07    Ca    9.4      23 Dec 2017 05:23  Phos  4.2       Mg     2.4         TPro  7.6  /  Alb  4.7  /  TBili  4.8<H>  /  DBili  x   /  AST  60<H>  /  ALT  44  /  AlkPhos  73        Urinalysis Basic - ( 23 Dec 2017 05:48 )    Color: Yellow / Appearance: SL Turbid / S.018 / pH: x  Gluc: x / Ketone: Negative  / Bili: Negative / Urobili: 2   Blood: x / Protein: Trace / Nitrite: Negative   Leuk Esterase: Negative / RBC: 2-5 /HPF / WBC x   Sq Epi: x / Non Sq Epi: x / Bacteria: x

## 2017-12-23 NOTE — DISCHARGE NOTE ADULT - CARE PLAN
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 They believed this may have been caused by your previous infection with EBV (mononucleosis). 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 They believed this may have been caused by your previous infection with EBV (mononucleosis). Please continue to take your prednisone (steroid) till you see your hematologist-oncologist doctor within one week of discharge. You are also prescribed a reflux medication to help protect your stomach while you take your steroids.

## 2017-12-23 NOTE — DISCHARGE NOTE ADULT - PLAN OF CARE
Resolution You came to the hospital for jaundice and were discovered to have an autoimmune hemolytic anemia. They believed this may have been caused by your previous infection with EBV (mononucleosis). You came to the hospital for jaundice and were discovered to have an autoimmune hemolytic anemia. They believed this may have been caused by your previous infection with EBV (mononucleosis). Please continue to take your prednisone (steroid) till you see your hematologist-oncologist doctor within one week of discharge. You are also prescribed a reflux medication to help protect your stomach while you take your steroids.

## 2017-12-23 NOTE — DISCHARGE NOTE ADULT - CARE PROVIDER_API CALL
Fiona Newman (DO), HematologyOncology; Internal Medicine  [address] [place] [address]  Phone: [phone]  Fax: [phone]

## 2017-12-23 NOTE — DISCHARGE NOTE ADULT - MEDICATION SUMMARY - MEDICATIONS TO TAKE
I will START or STAY ON the medications listed below when I get home from the hospital:    predniSONE 50 mg oral tablet  -- 2 tab(s) by mouth once a day  -- Indication: For Hemolytic anemia due to warm antibody    omeprazole 20 mg oral delayed release capsule  -- 1 cap(s) by mouth once a day   -- It is very important that you take or use this exactly as directed.  Do not skip doses or discontinue unless directed by your doctor.  Obtain medical advice before taking any non-prescription drugs as some may affect the action of this medication.  Swallow whole.  Do not crush.    -- Indication: For Steroid use    Multiple Vitamins oral tablet  -- 1 tab(s) by mouth once a day  -- Indication: For Nutritional Supplement

## 2017-12-23 NOTE — PROGRESS NOTE ADULT - PROBLEM SELECTOR PLAN 1
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  -May be 2/2 underlying EBV from past mononucleosis infection. HIV can cause this as well, thus will f/u HIV and EBV levels.  - Can be caused by CLL or SLE, will f/u dsDNA, JACK, and complement  - Appreciate heme recs, Prednisone 1mg/kg QD.  - trend Hb to >10. If Hb not improving, would likely need to add Rituximab.  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  -May be 2/2 underlying EBV from past mononucleosis infection. HIV can cause this as well, thus will f/u HIV and EBV levels.  - Can be caused by CLL or SLE, will f/u dsDNA, JACK, and complement  - Appreciate heme recs, Prednisone 1mg/kg QD with low dose ISS  - trend Hb to >10. If Hb not improving, would likely need to add Rituximab.  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  -May be 2/2 underlying EBV from past mononucleosis infection. HIV can cause this as well, thus will f/u HIV and EBV levels.  - Can be caused by CLL or SLE, will f/u dsDNA, JACK, and complement  - Appreciate heme recs, Prednisone 1mg/kg QD with low dose ISS  - If Hb not improving, would likely need to add Rituximab.  - CXR is WNL

## 2017-12-23 NOTE — PROGRESS NOTE ADULT - PROBLEM SELECTOR PLAN 2
- DVT PPX: IMPROVE 0, no PPX needed  - Diet: regular most likely 2/2 anemia. Will continue to monitor and manage as above

## 2017-12-24 VITALS
OXYGEN SATURATION: 100 % | DIASTOLIC BLOOD PRESSURE: 78 MMHG | HEART RATE: 83 BPM | SYSTOLIC BLOOD PRESSURE: 131 MMHG | TEMPERATURE: 98 F | RESPIRATION RATE: 16 BRPM

## 2017-12-24 LAB
ALBUMIN SERPL ELPH-MCNC: 4.3 G/DL — SIGNIFICANT CHANGE UP (ref 3.3–5)
ALP SERPL-CCNC: 63 U/L — SIGNIFICANT CHANGE UP (ref 40–120)
ALT FLD-CCNC: 38 U/L RC — SIGNIFICANT CHANGE UP (ref 10–45)
ANA TITR SER: NEGATIVE — SIGNIFICANT CHANGE UP
ANION GAP SERPL CALC-SCNC: 10 MMOL/L — SIGNIFICANT CHANGE UP (ref 5–17)
AST SERPL-CCNC: 47 U/L — HIGH (ref 10–40)
BILIRUB SERPL-MCNC: 3.5 MG/DL — HIGH (ref 0.2–1.2)
BUN SERPL-MCNC: 20 MG/DL — SIGNIFICANT CHANGE UP (ref 7–23)
CALCIUM SERPL-MCNC: 9.1 MG/DL — SIGNIFICANT CHANGE UP (ref 8.4–10.5)
CHLORIDE SERPL-SCNC: 103 MMOL/L — SIGNIFICANT CHANGE UP (ref 96–108)
CO2 SERPL-SCNC: 28 MMOL/L — SIGNIFICANT CHANGE UP (ref 22–31)
CREAT SERPL-MCNC: 1.12 MG/DL — SIGNIFICANT CHANGE UP (ref 0.5–1.3)
EBV EA AB SER IA-ACNC: <5 U/ML — SIGNIFICANT CHANGE UP
EBV EA AB TITR SER IF: NEGATIVE — SIGNIFICANT CHANGE UP
EBV EA IGG SER-ACNC: NEGATIVE — SIGNIFICANT CHANGE UP
EBV NA IGG SER IA-ACNC: <3 U/ML — SIGNIFICANT CHANGE UP
EBV PATRN SPEC IB-IMP: SIGNIFICANT CHANGE UP
EBV VCA IGG AVIDITY SER QL IA: NEGATIVE — SIGNIFICANT CHANGE UP
EBV VCA IGM SER IA-ACNC: <10 U/ML — SIGNIFICANT CHANGE UP
EBV VCA IGM SER IA-ACNC: <10 U/ML — SIGNIFICANT CHANGE UP
EBV VCA IGM TITR FLD: NEGATIVE — SIGNIFICANT CHANGE UP
GLUCOSE BLDC GLUCOMTR-MCNC: 126 MG/DL — HIGH (ref 70–99)
GLUCOSE BLDC GLUCOMTR-MCNC: 140 MG/DL — HIGH (ref 70–99)
GLUCOSE SERPL-MCNC: 110 MG/DL — HIGH (ref 70–99)
HAPTOGLOB SERPL-MCNC: <20 MG/DL — LOW (ref 34–200)
HCT VFR BLD CALC: 21.1 % — LOW (ref 39–50)
HGB BLD-MCNC: 7.3 G/DL — LOW (ref 13–17)
LDH SERPL L TO P-CCNC: 842 U/L — HIGH (ref 50–242)
MAGNESIUM SERPL-MCNC: 2.2 MG/DL — SIGNIFICANT CHANGE UP (ref 1.6–2.6)
MCHC RBC-ENTMCNC: 36.5 GM/DL — HIGH (ref 32–36)
MCHC RBC-ENTMCNC: 38.2 PG — HIGH (ref 27–34)
MCV RBC AUTO: 106 FL — HIGH (ref 80–100)
PHOSPHATE SERPL-MCNC: 3.3 MG/DL — SIGNIFICANT CHANGE UP (ref 2.5–4.5)
PLATELET # BLD AUTO: 252 K/UL — SIGNIFICANT CHANGE UP (ref 150–400)
POTASSIUM SERPL-MCNC: 3.6 MMOL/L — SIGNIFICANT CHANGE UP (ref 3.5–5.3)
POTASSIUM SERPL-SCNC: 3.6 MMOL/L — SIGNIFICANT CHANGE UP (ref 3.5–5.3)
PROT SERPL-MCNC: 7 G/DL — SIGNIFICANT CHANGE UP (ref 6–8.3)
RBC # BLD: 1.81 M/UL — LOW (ref 4.2–5.8)
RBC # BLD: 1.81 M/UL — LOW (ref 4.2–5.8)
RBC # FLD: 24.4 % — HIGH (ref 10.3–14.5)
RETICS #: 868 K/UL — HIGH (ref 25–125)
RETICS/RBC NFR: 47.9 % — HIGH (ref 0.5–2.5)
SODIUM SERPL-SCNC: 141 MMOL/L — SIGNIFICANT CHANGE UP (ref 135–145)
WBC # BLD: 16.3 K/UL — HIGH (ref 3.8–10.5)
WBC # FLD AUTO: 16.3 K/UL — HIGH (ref 3.8–10.5)

## 2017-12-24 PROCEDURE — 86905 BLOOD TYPING RBC ANTIGENS: CPT

## 2017-12-24 PROCEDURE — 99232 SBSQ HOSP IP/OBS MODERATE 35: CPT

## 2017-12-24 PROCEDURE — 83010 ASSAY OF HAPTOGLOBIN QUANT: CPT

## 2017-12-24 PROCEDURE — 85045 AUTOMATED RETICULOCYTE COUNT: CPT

## 2017-12-24 PROCEDURE — 82746 ASSAY OF FOLIC ACID SERUM: CPT

## 2017-12-24 PROCEDURE — 86225 DNA ANTIBODY NATIVE: CPT

## 2017-12-24 PROCEDURE — 71045 X-RAY EXAM CHEST 1 VIEW: CPT

## 2017-12-24 PROCEDURE — 86664 EPSTEIN-BARR NUCLEAR ANTIGEN: CPT

## 2017-12-24 PROCEDURE — 86880 COOMBS TEST DIRECT: CPT

## 2017-12-24 PROCEDURE — 99239 HOSP IP/OBS DSCHRG MGMT >30: CPT

## 2017-12-24 PROCEDURE — 83735 ASSAY OF MAGNESIUM: CPT

## 2017-12-24 PROCEDURE — 82607 VITAMIN B-12: CPT

## 2017-12-24 PROCEDURE — 87389 HIV-1 AG W/HIV-1&-2 AB AG IA: CPT

## 2017-12-24 PROCEDURE — 86922 COMPATIBILITY TEST ANTIGLOB: CPT

## 2017-12-24 PROCEDURE — 86901 BLOOD TYPING SEROLOGIC RH(D): CPT

## 2017-12-24 PROCEDURE — 86160 COMPLEMENT ANTIGEN: CPT

## 2017-12-24 PROCEDURE — 84100 ASSAY OF PHOSPHORUS: CPT

## 2017-12-24 PROCEDURE — 99285 EMERGENCY DEPT VISIT HI MDM: CPT

## 2017-12-24 PROCEDURE — 86038 ANTINUCLEAR ANTIBODIES: CPT

## 2017-12-24 PROCEDURE — 83615 LACTATE (LD) (LDH) ENZYME: CPT

## 2017-12-24 PROCEDURE — 81001 URINALYSIS AUTO W/SCOPE: CPT

## 2017-12-24 PROCEDURE — 86850 RBC ANTIBODY SCREEN: CPT

## 2017-12-24 PROCEDURE — G0378: CPT

## 2017-12-24 PROCEDURE — 82248 BILIRUBIN DIRECT: CPT

## 2017-12-24 PROCEDURE — 86900 BLOOD TYPING SEROLOGIC ABO: CPT

## 2017-12-24 PROCEDURE — 86860 RBC ANTIBODY ELUTION: CPT

## 2017-12-24 PROCEDURE — 82247 BILIRUBIN TOTAL: CPT

## 2017-12-24 PROCEDURE — 85027 COMPLETE CBC AUTOMATED: CPT

## 2017-12-24 PROCEDURE — 83550 IRON BINDING TEST: CPT

## 2017-12-24 PROCEDURE — 86663 EPSTEIN-BARR ANTIBODY: CPT

## 2017-12-24 PROCEDURE — 82728 ASSAY OF FERRITIN: CPT

## 2017-12-24 PROCEDURE — 80053 COMPREHEN METABOLIC PANEL: CPT

## 2017-12-24 PROCEDURE — 82962 GLUCOSE BLOOD TEST: CPT

## 2017-12-24 PROCEDURE — 86665 EPSTEIN-BARR CAPSID VCA: CPT

## 2017-12-24 PROCEDURE — 86870 RBC ANTIBODY IDENTIFICATION: CPT

## 2017-12-24 RX ORDER — OMEPRAZOLE 10 MG/1
1 CAPSULE, DELAYED RELEASE ORAL
Qty: 14 | Refills: 0 | OUTPATIENT
Start: 2017-12-24 | End: 2018-01-06

## 2017-12-24 RX ADMIN — Medication 100 MILLIGRAM(S): at 05:28

## 2017-12-24 RX ADMIN — Medication 1 TABLET(S): at 10:55

## 2017-12-24 NOTE — PROGRESS NOTE ADULT - PROBLEM SELECTOR PLAN 3
- DVT PPX: IMPROVE 0, no PPX needed  - Diet: regular
- DVT PPX: IMPROVE 0, no PPX needed  - Diet: regular

## 2017-12-24 NOTE — PROGRESS NOTE ADULT - PROBLEM SELECTOR PROBLEM 1
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
Hemolytic anemia
Hemolytic anemia
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

## 2017-12-24 NOTE — PROGRESS NOTE ADULT - SUBJECTIVE AND OBJECTIVE BOX
CASSIDY HILLS  22y  MRN: [id number]    Subjective:    Patient is a 22y old  Male who presents with a chief complaint of jaundice and ARDON (23 Dec 2017 15:05)    Overnight Events: no complaints of CP, SOB, palpitations, fevers/chills/rash. All other ROS is negative      MEDICATIONS  (STANDING):  insulin lispro (HumaLOG) corrective regimen sliding scale   SubCutaneous three times a day before meals  insulin lispro (HumaLOG) corrective regimen sliding scale   SubCutaneous at bedtime  multivitamin 1 Tablet(s) Oral daily  predniSONE   Tablet 100 milliGRAM(s) Oral daily    MEDICATIONS  (PRN):        Objective:    Vitals: Vital Signs Last 24 Hrs  T(C): 36.7 (17 @ 04:44), Max: 36.9 (17 @ 13:53)  T(F): 98 (17 @ 04:44), Max: 98.5 (17 @ 21:34)  HR: 73 (17 @ 04:44) (73 - 87)  BP: 94/57 (17 @ 04:44) (94/57 - 115/74)  BP(mean): --  RR: 18 (17 @ 04:44) (18 - 18)  SpO2: 94% (17 @ 04:44) (94% - 98%)            I&O's Summary      PHYSICAL EXAM:  GENERAL: NAD, well-groomed, well-developed  HEAD:  Atraumatic, Normocephalic  EYES:  conjunctiva and sclera clear  ENMT: No tonsillar erythema, exudates, or enlargement; Moist mucous membranes  NECK: Supple, No JVD, Normal thyroid  CHEST/LUNG: Clear to percussion bilaterally; No rales, rhonchi, wheezing, or rubs  HEART: Regular rate and rhythm; No murmurs, rubs, or gallops  ABDOMEN: Soft, Nontender, Nondistended; Bowel sounds present, no hepatosplenomegaly  EXTREMITIES:  2+ Peripheral Pulses, No clubbing, cyanosis, or edema  LYMPH: No lymphadenopathy noted  SKIN: No rashes or lesions  NERVOUS SYSTEM:  Alert & Oriented X3    LABS:      141  |  103  |  20  ----------------------------<  110<H>  3.6   |  28  |  1.12      140  |  103  |  17  ----------------------------<  163<H>  4.6   |  26  |  1.07      141  |  104  |  16  ----------------------------<  108<H>  3.9   |  26  |  1.13    Ca    9.1      24 Dec 2017 06:38  Ca    9.4      23 Dec 2017 05:23  Ca    9.0      22 Dec 2017 13:36  Phos  3.3       Mg     2.2         TPro  7.0  /  Alb  4.3  /  TBili  3.5<H>  /  DBili  x   /  AST  47<H>  /  ALT  38  /  AlkPhos  63    TPro  7.6  /  Alb  4.7  /  TBili  4.8<H>  /  DBili  x   /  AST  60<H>  /  ALT  44  /  AlkPhos  73    TPro  7.4  /  Alb  4.8  /  TBili  4.9<H>  /  DBili  0.3<H>  /  AST  63<H>  /  ALT  43  /  AlkPhos  68                      Urinalysis Basic - ( 23 Dec 2017 05:48 )    Color: Yellow / Appearance: SL Turbid / S.018 / pH: x  Gluc: x / Ketone: Negative  / Bili: Negative / Urobili: 2   Blood: x / Protein: Trace / Nitrite: Negative   Leuk Esterase: Negative / RBC: 2-5 /HPF / WBC x   Sq Epi: x / Non Sq Epi: x / Bacteria: x                              7.3    16.3  )-----------( 252      ( 24 Dec 2017 06:38 )             21.1                         6.6    15.8  )-----------( 288      ( 23 Dec 2017 05:22 )             SEE NOTE                        6.6    11.7  )-----------( 252      ( 22 Dec 2017 13:36 )             18.5     CAPILLARY BLOOD GLUCOSE      POCT Blood Glucose.: 126 mg/dL (24 Dec 2017 08:32)  POCT Blood Glucose.: 142 mg/dL (23 Dec 2017 22:18)  POCT Blood Glucose.: 177 mg/dL (23 Dec 2017 17:56)  POCT Blood Glucose.: 188 mg/dL (23 Dec 2017 12:29)      RADIOLOGY & ADDITIONAL TESTS:    Imaging Personally Reviewed:  [ ] YES  [ ] NO      Consultants involved in case: Heme-onc  Consultant(s) Notes Reviewed:  [X] YES  [ ] NO:   Care Discussed with Consultants/Other Providers [X] YES  [ ] NO        Dr. Ashley Franco, PGY2  [facility] Pager: [phone]  [facility] Pager: [phone] CASSIDY HILLS  22y  MRN: [id number]    Subjective:    Patient is a 22y old  Male who presents with a chief complaint of jaundice and ARDON (23 Dec 2017 15:05)    Overnight Events: no complaints of CP, SOB, palpitations, fevers/chills/rash. All other ROS is negative      MEDICATIONS  (STANDING):  insulin lispro (HumaLOG) corrective regimen sliding scale   SubCutaneous three times a day before meals  insulin lispro (HumaLOG) corrective regimen sliding scale   SubCutaneous at bedtime  multivitamin 1 Tablet(s) Oral daily  predniSONE   Tablet 100 milliGRAM(s) Oral daily    MEDICATIONS  (PRN):        Objective:    Vitals: Vital Signs Last 24 Hrs  T(C): 36.7 (17 @ 04:44), Max: 36.9 (17 @ 13:53)  T(F): 98 (17 @ 04:44), Max: 98.5 (17 @ 21:34)  HR: 73 (17 @ 04:44) (73 - 87)  BP: 94/57 (17 @ 04:44) (94/57 - 115/74)  BP(mean): --  RR: 18 (17 @ 04:44) (18 - 18)  SpO2: 94% (17 @ 04:44) (94% - 98%)            I&O's Summary      PHYSICAL EXAM:  GENERAL: NAD, well-groomed, well-developed  HEAD:  Atraumatic, Normocephalic  EYES:  conjunctiva and sclera clear  ENMT: No tonsillar erythema, exudates, or enlargement; Moist mucous membranes  NECK: Supple, No JVD, Normal thyroid  CHEST/LUNG: Clear to percussion bilaterally; No rales, rhonchi, wheezing, or rubs  HEART: Regular rate and rhythm; No murmurs, rubs, or gallops  ABDOMEN: Soft, Nontender, Nondistended; Bowel sounds present, no hepatosplenomegaly  EXTREMITIES:  2+ Peripheral Pulses, No clubbing, cyanosis, or edema  LYMPH: No lymphadenopathy noted  SKIN: No rashes or lesions  NERVOUS SYSTEM:  Alert & Oriented X3    LABS:  personally reviewed      141  |  103  |  20  ----------------------------<  110<H>  3.6   |  28  |  1.12      140  |  103  |  17  ----------------------------<  163<H>  4.6   |  26  |  1.07      141  |  104  |  16  ----------------------------<  108<H>  3.9   |  26  |  1.13    Ca    9.1      24 Dec 2017 06:38  Ca    9.4      23 Dec 2017 05:23  Ca    9.0      22 Dec 2017 13:36  Phos  3.3       Mg     2.2         TPro  7.0  /  Alb  4.3  /  TBili  3.5<H>  /  DBili  x   /  AST  47<H>  /  ALT  38  /  AlkPhos  63    TPro  7.6  /  Alb  4.7  /  TBili  4.8<H>  /  DBili  x   /  AST  60<H>  /  ALT  44  /  AlkPhos  73    TPro  7.4  /  Alb  4.8  /  TBili  4.9<H>  /  DBili  0.3<H>  /  AST  63<H>  /  ALT  43  /  AlkPhos  68                      Urinalysis Basic - ( 23 Dec 2017 05:48 )    Color: Yellow / Appearance: SL Turbid / S.018 / pH: x  Gluc: x / Ketone: Negative  / Bili: Negative / Urobili: 2   Blood: x / Protein: Trace / Nitrite: Negative   Leuk Esterase: Negative / RBC: 2-5 /HPF / WBC x   Sq Epi: x / Non Sq Epi: x / Bacteria: x                              7.3    16.3  )-----------( 252      ( 24 Dec 2017 06:38 )             21.1                         6.6    15.8  )-----------( 288      ( 23 Dec 2017 05:22 )             SEE NOTE                        6.6    11.7  )-----------( 252      ( 22 Dec 2017 13:36 )             18.5     CAPILLARY BLOOD GLUCOSE      POCT Blood Glucose.: 126 mg/dL (24 Dec 2017 08:32)  POCT Blood Glucose.: 142 mg/dL (23 Dec 2017 22:18)  POCT Blood Glucose.: 177 mg/dL (23 Dec 2017 17:56)  POCT Blood Glucose.: 188 mg/dL (23 Dec 2017 12:29)      RADIOLOGY & ADDITIONAL TESTS:    Imaging Personally Reviewed:  [ ] YES  [ ] NO      Consultants involved in case: Heme-onc  Consultant(s) Notes Reviewed:  [X] YES  [ ] NO:   Care Discussed with Consultants/Other Providers [X] YES  [ ] NO        Dr. Ashley Franco, PGY2  [facility] Pager: [phone]  [facility] Pager: [phone]

## 2017-12-24 NOTE — PROGRESS NOTE ADULT - ATTENDING COMMENTS
[phone]
[phone]
Pt with clinical improvement and H/H improved upon prednisone. Care discussed with Dr. Newman (hematology) - given his clinical stability and improvement, continue on prednisone 100mg daily for now until heme appt. Pt will call heme office on Tuesday. Safe discharge planning discussed with patient, resident and Dr. Newman. All questions and concerns were addressed.
Vaishali Ellison MD  Division of Hospital Medicine  Pager: [phone]  Office: [phone]

## 2017-12-24 NOTE — PROGRESS NOTE ADULT - SUBJECTIVE AND OBJECTIVE BOX
Chief Complaint: warm hemolytic anemia     INTERVAL HPI/OVERNIGHT EVENTS: Feels well. No c/o. Hb increased today.     MEDICATIONS  (STANDING):  insulin lispro (HumaLOG) corrective regimen sliding scale   SubCutaneous three times a day before meals  insulin lispro (HumaLOG) corrective regimen sliding scale   SubCutaneous at bedtime  multivitamin 1 Tablet(s) Oral daily  predniSONE   Tablet 100 milliGRAM(s) Oral daily    MEDICATIONS  (PRN):      Allergies    No Known Allergies    Intolerances        ROS: as above     Vital Signs Last 24 Hrs  T(C): 36.4 (24 Dec 2017 13:23), Max: 36.9 (23 Dec 2017 21:34)  T(F): 97.5 (24 Dec 2017 13:23), Max: 98.5 (23 Dec 2017 21:34)  HR: 83 (24 Dec 2017 13:23) (73 - 83)  BP: 131/78 (24 Dec 2017 13:23) (94/57 - 131/78)  BP(mean): --  RR: 16 (24 Dec 2017 13:23) (16 - 18)  SpO2: 100% (24 Dec 2017 13:23) (94% - 100%)    Physical Exam:   AAO x 3, NAD   RRR S1S2  CTA b/l   soft NTNDBS+  no c/c/e    LABS:                        7.3    16.3  )-----------( 252      ( 24 Dec 2017 06:38 )             21.1     12-    141  |  103  |  20  ----------------------------<  110<H>  3.6   |  28  |  1.12    Ca    9.1      24 Dec 2017 06:38  Phos  3.3     12-24  Mg     2.2     12-24    TPro  7.0  /  Alb  4.3  /  TBili  3.5<H>  /  DBili  x   /  AST  47<H>  /  ALT  38  /  AlkPhos  63  12-24      Urinalysis Basic - ( 23 Dec 2017 05:48 )    Color: Yellow / Appearance: SL Turbid / S.018 / pH: x  Gluc: x / Ketone: Negative  / Bili: Negative / Urobili: 2   Blood: x / Protein: Trace / Nitrite: Negative   Leuk Esterase: Negative / RBC: 2-5 /HPF / WBC x   Sq Epi: x / Non Sq Epi: x / Bacteria: x

## 2017-12-24 NOTE — PROGRESS NOTE ADULT - ASSESSMENT
21 yo M with no PMH presents sent from PCP with jaundice and ARDON, 2/2 warm hemolytic anemia.
22M admitted with warm hemolytic anemia, D2 of Prednisone
22M admitted with warm hemolytic anemia, D3 of Prednisone with improvement in Hb
23 yo M with no PMH presents sent from PCP with jaundice and ARDON, 2/2 warm hemolytic anemia.

## 2017-12-24 NOTE — PROGRESS NOTE ADULT - PROBLEM SELECTOR PLAN 1
Warm hemolytic anemia on Prednisone 1 mg/kg x 3 doses.   Improving Hb, decreasing LDH and Tbili.   Pt feels well. Ok to d/c today with same dose Prednisone.   Should f/u with hematology at [facility] within 1 week. Pt instructed to call office on Tues [phone] to schedule apt.   work up for underlying causes in progress to be completed as out pt.

## 2017-12-24 NOTE — PROGRESS NOTE ADULT - PROBLEM SELECTOR PLAN 1
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

## 2017-12-26 ENCOUNTER — OUTPATIENT (OUTPATIENT)
Dept: OUTPATIENT SERVICES | Facility: HOSPITAL | Age: 22
LOS: 1 days | Discharge: HOME | End: 2017-12-26

## 2017-12-26 DIAGNOSIS — D59.1 OTHER AUTOIMMUNE HEMOLYTIC ANEMIAS: ICD-10-CM

## 2017-12-26 PROBLEM — Z00.00 ENCOUNTER FOR PREVENTIVE HEALTH EXAMINATION: Status: ACTIVE | Noted: 2017-12-26

## 2017-12-26 LAB — DSDNA AB SER-ACNC: <12 IU/ML — SIGNIFICANT CHANGE UP

## 2017-12-29 ENCOUNTER — OUTPATIENT (OUTPATIENT)
Dept: OUTPATIENT SERVICES | Facility: HOSPITAL | Age: 22
LOS: 1 days | End: 2017-12-29
Payer: COMMERCIAL

## 2017-12-29 ENCOUNTER — RESULT REVIEW (OUTPATIENT)
Age: 22
End: 2017-12-29

## 2017-12-29 ENCOUNTER — APPOINTMENT (OUTPATIENT)
Dept: HEMATOLOGY ONCOLOGY | Facility: CLINIC | Age: 22
End: 2017-12-29
Payer: COMMERCIAL

## 2017-12-29 VITALS
DIASTOLIC BLOOD PRESSURE: 89 MMHG | SYSTOLIC BLOOD PRESSURE: 127 MMHG | OXYGEN SATURATION: 99 % | HEART RATE: 80 BPM | BODY MASS INDEX: 32.26 KG/M2 | HEIGHT: 70.08 IN | WEIGHT: 225.31 LBS | RESPIRATION RATE: 16 BRPM | TEMPERATURE: 98.2 F

## 2017-12-29 DIAGNOSIS — D59.1 OTHER AUTOIMMUNE HEMOLYTIC ANEMIAS: ICD-10-CM

## 2017-12-29 DIAGNOSIS — B27.90 INFECTIOUS MONONUCLEOSIS, UNSPECIFIED W/OUT COMPLICATION: ICD-10-CM

## 2017-12-29 DIAGNOSIS — Z78.9 OTHER SPECIFIED HEALTH STATUS: ICD-10-CM

## 2017-12-29 DIAGNOSIS — Z82.0 FAMILY HISTORY OF EPILEPSY AND OTHER DISEASES OF THE NERVOUS SYSTEM: ICD-10-CM

## 2017-12-29 PROCEDURE — 99204 OFFICE O/P NEW MOD 45 MIN: CPT

## 2017-12-30 PROCEDURE — 86900 BLOOD TYPING SEROLOGIC ABO: CPT

## 2017-12-30 PROCEDURE — 86901 BLOOD TYPING SEROLOGIC RH(D): CPT

## 2017-12-30 PROCEDURE — 86850 RBC ANTIBODY SCREEN: CPT

## 2017-12-30 PROCEDURE — 86922 COMPATIBILITY TEST ANTIGLOB: CPT

## 2017-12-30 PROCEDURE — 86880 COOMBS TEST DIRECT: CPT

## 2017-12-30 PROCEDURE — 86870 RBC ANTIBODY IDENTIFICATION: CPT

## 2018-01-02 LAB
ALBUMIN SERPL ELPH-MCNC: 4.9 G/DL
ALP BLD-CCNC: 66 U/L
ALT SERPL-CCNC: 39 U/L
ANION GAP SERPL CALC-SCNC: 14 MMOL/L
AST SERPL-CCNC: 36 U/L
BILIRUB SERPL-MCNC: 2.7 MG/DL
BUN SERPL-MCNC: 17 MG/DL
CALCIUM SERPL-MCNC: 9.3 MG/DL
CHLORIDE SERPL-SCNC: 101 MMOL/L
CMV IGG SERPL QL: <0.2 U/ML
CMV IGG SERPL-IMP: NEGATIVE
CO2 SERPL-SCNC: 26 MMOL/L
CREAT SERPL-MCNC: 0.87 MG/DL
CRP SERPL-MCNC: <0.2 MG/DL
GLUCOSE SERPL-MCNC: 129 MG/DL
HAPTOGLOB SERPL-MCNC: <20 MG/DL
HAV IGM SER QL: NONREACTIVE
HBV CORE IGM SER QL: NONREACTIVE
HBV SURFACE AG SER QL: NONREACTIVE
HCV AB SER QL: NONREACTIVE
HCV S/CO RATIO: 0.15 S/CO
LDH SERPL-CCNC: 689 U/L
POTASSIUM SERPL-SCNC: 4.2 MMOL/L
PROT SERPL-MCNC: 7.6 G/DL
RHEUMATOID FACT SER QL: <7 IU/ML
SODIUM SERPL-SCNC: 141 MMOL/L

## 2018-01-06 ENCOUNTER — LABORATORY RESULT (OUTPATIENT)
Age: 23
End: 2018-01-06

## 2018-01-12 ENCOUNTER — LABORATORY RESULT (OUTPATIENT)
Age: 23
End: 2018-01-12

## 2018-01-19 ENCOUNTER — APPOINTMENT (OUTPATIENT)
Dept: CT IMAGING | Facility: IMAGING CENTER | Age: 23
End: 2018-01-19
Payer: COMMERCIAL

## 2018-01-19 ENCOUNTER — OUTPATIENT (OUTPATIENT)
Dept: OUTPATIENT SERVICES | Facility: HOSPITAL | Age: 23
LOS: 1 days | End: 2018-01-19
Payer: COMMERCIAL

## 2018-01-19 DIAGNOSIS — D59.1 OTHER AUTOIMMUNE HEMOLYTIC ANEMIAS: ICD-10-CM

## 2018-01-19 PROCEDURE — 74177 CT ABD & PELVIS W/CONTRAST: CPT | Mod: 26

## 2018-01-19 PROCEDURE — 71260 CT THORAX DX C+: CPT

## 2018-01-19 PROCEDURE — 71260 CT THORAX DX C+: CPT | Mod: 26

## 2018-01-19 PROCEDURE — 74177 CT ABD & PELVIS W/CONTRAST: CPT

## 2018-01-22 ENCOUNTER — LABORATORY RESULT (OUTPATIENT)
Age: 23
End: 2018-01-22

## 2018-02-01 ENCOUNTER — APPOINTMENT (OUTPATIENT)
Dept: SURGICAL ONCOLOGY | Facility: CLINIC | Age: 23
End: 2018-02-01
Payer: COMMERCIAL

## 2018-02-01 VITALS
SYSTOLIC BLOOD PRESSURE: 125 MMHG | DIASTOLIC BLOOD PRESSURE: 85 MMHG | OXYGEN SATURATION: 97 % | BODY MASS INDEX: 31.78 KG/M2 | HEIGHT: 70 IN | HEART RATE: 93 BPM | WEIGHT: 222 LBS

## 2018-02-01 PROCEDURE — 99244 OFF/OP CNSLTJ NEW/EST MOD 40: CPT

## 2018-02-06 ENCOUNTER — OUTPATIENT (OUTPATIENT)
Dept: OUTPATIENT SERVICES | Facility: HOSPITAL | Age: 23
LOS: 1 days | Discharge: HOME | End: 2018-02-06

## 2018-02-06 ENCOUNTER — APPOINTMENT (OUTPATIENT)
Dept: HEMATOLOGY ONCOLOGY | Facility: CLINIC | Age: 23
End: 2018-02-06

## 2018-02-06 ENCOUNTER — RESULT REVIEW (OUTPATIENT)
Age: 23
End: 2018-02-06

## 2018-02-06 DIAGNOSIS — D59.1 OTHER AUTOIMMUNE HEMOLYTIC ANEMIAS: ICD-10-CM

## 2018-02-06 LAB
HCT VFR BLD CALC: 39.9 % — SIGNIFICANT CHANGE UP (ref 39–50)
HGB BLD-MCNC: 14.8 G/DL — SIGNIFICANT CHANGE UP (ref 13–17)
MCHC RBC-ENTMCNC: 34.2 PG — HIGH (ref 27–34)
MCHC RBC-ENTMCNC: 37 G/DL — HIGH (ref 32–36)
MCV RBC AUTO: 92.5 FL — SIGNIFICANT CHANGE UP (ref 80–100)
PLATELET # BLD AUTO: 255 K/UL — SIGNIFICANT CHANGE UP (ref 150–400)
RBC # BLD: 4.32 M/UL — SIGNIFICANT CHANGE UP (ref 4.2–5.8)
RBC # FLD: 12.4 % — SIGNIFICANT CHANGE UP (ref 10.3–14.5)
WBC # BLD: 14 K/UL — HIGH (ref 3.8–10.5)
WBC # FLD AUTO: 14 K/UL — HIGH (ref 3.8–10.5)

## 2018-02-08 ENCOUNTER — FORM ENCOUNTER (OUTPATIENT)
Age: 23
End: 2018-02-08

## 2018-02-08 ENCOUNTER — RESULT REVIEW (OUTPATIENT)
Age: 23
End: 2018-02-08

## 2018-02-08 ENCOUNTER — APPOINTMENT (OUTPATIENT)
Dept: HEMATOLOGY ONCOLOGY | Facility: CLINIC | Age: 23
End: 2018-02-08

## 2018-02-08 LAB
HCT VFR BLD CALC: 39.7 % — SIGNIFICANT CHANGE UP (ref 39–50)
HGB BLD-MCNC: 14.9 G/DL — SIGNIFICANT CHANGE UP (ref 13–17)
MCHC RBC-ENTMCNC: 34.6 PG — HIGH (ref 27–34)
MCHC RBC-ENTMCNC: 37.4 G/DL — HIGH (ref 32–36)
MCV RBC AUTO: 92.5 FL — SIGNIFICANT CHANGE UP (ref 80–100)
PLATELET # BLD AUTO: 243 K/UL — SIGNIFICANT CHANGE UP (ref 150–400)
RBC # BLD: 4.3 M/UL — SIGNIFICANT CHANGE UP (ref 4.2–5.8)
RBC # FLD: 12.4 % — SIGNIFICANT CHANGE UP (ref 10.3–14.5)
WBC # BLD: 11.5 K/UL — HIGH (ref 3.8–10.5)
WBC # FLD AUTO: 11.5 K/UL — HIGH (ref 3.8–10.5)

## 2018-02-09 ENCOUNTER — RESULT REVIEW (OUTPATIENT)
Age: 23
End: 2018-02-09

## 2018-02-09 ENCOUNTER — OUTPATIENT (OUTPATIENT)
Dept: OUTPATIENT SERVICES | Facility: HOSPITAL | Age: 23
LOS: 1 days | End: 2018-02-09
Payer: COMMERCIAL

## 2018-02-09 ENCOUNTER — APPOINTMENT (OUTPATIENT)
Dept: ULTRASOUND IMAGING | Facility: IMAGING CENTER | Age: 23
End: 2018-02-09
Payer: COMMERCIAL

## 2018-02-09 DIAGNOSIS — R22.2 LOCALIZED SWELLING, MASS AND LUMP, TRUNK: ICD-10-CM

## 2018-02-09 PROCEDURE — 20206 BIOPSY MUSCLE PERQ NEEDLE: CPT

## 2018-02-09 PROCEDURE — 76942 ECHO GUIDE FOR BIOPSY: CPT | Mod: 26

## 2018-02-09 PROCEDURE — 88305 TISSUE EXAM BY PATHOLOGIST: CPT | Mod: 26

## 2018-02-09 PROCEDURE — 88305 TISSUE EXAM BY PATHOLOGIST: CPT

## 2018-02-09 PROCEDURE — 76942 ECHO GUIDE FOR BIOPSY: CPT

## 2018-02-12 LAB — SURGICAL PATHOLOGY STUDY: SIGNIFICANT CHANGE UP

## 2018-02-15 ENCOUNTER — LABORATORY RESULT (OUTPATIENT)
Age: 23
End: 2018-02-15

## 2018-02-26 ENCOUNTER — LABORATORY RESULT (OUTPATIENT)
Age: 23
End: 2018-02-26

## 2018-03-16 ENCOUNTER — OUTPATIENT (OUTPATIENT)
Dept: OUTPATIENT SERVICES | Facility: HOSPITAL | Age: 23
LOS: 1 days | Discharge: HOME | End: 2018-03-16
Payer: COMMERCIAL

## 2018-03-16 DIAGNOSIS — D59.1 OTHER AUTOIMMUNE HEMOLYTIC ANEMIAS: ICD-10-CM

## 2018-03-19 ENCOUNTER — RESULT REVIEW (OUTPATIENT)
Age: 23
End: 2018-03-19

## 2018-03-19 ENCOUNTER — OUTPATIENT (OUTPATIENT)
Dept: OUTPATIENT SERVICES | Facility: HOSPITAL | Age: 23
LOS: 1 days | End: 2018-03-19
Payer: COMMERCIAL

## 2018-03-19 ENCOUNTER — APPOINTMENT (OUTPATIENT)
Dept: HEMATOLOGY ONCOLOGY | Facility: CLINIC | Age: 23
End: 2018-03-19
Payer: COMMERCIAL

## 2018-03-19 VITALS
RESPIRATION RATE: 16 BRPM | OXYGEN SATURATION: 97 % | SYSTOLIC BLOOD PRESSURE: 131 MMHG | HEART RATE: 79 BPM | WEIGHT: 238.1 LBS | TEMPERATURE: 98.8 F | DIASTOLIC BLOOD PRESSURE: 80 MMHG

## 2018-03-19 DIAGNOSIS — D59.1 OTHER AUTOIMMUNE HEMOLYTIC ANEMIAS: ICD-10-CM

## 2018-03-19 LAB
ANISOCYTOSIS BLD QL: SLIGHT — SIGNIFICANT CHANGE UP
BASOPHILS # BLD AUTO: 0 K/UL — SIGNIFICANT CHANGE UP (ref 0–0.2)
BASOPHILS NFR BLD AUTO: 0.6 % — SIGNIFICANT CHANGE UP (ref 0–2)
BLD GP AB SCN SERPL QL: POSITIVE — SIGNIFICANT CHANGE UP
EOSINOPHIL # BLD AUTO: 0.3 K/UL — SIGNIFICANT CHANGE UP (ref 0–0.5)
EOSINOPHIL NFR BLD AUTO: 2 % — SIGNIFICANT CHANGE UP (ref 0–6)
EOSINOPHIL NFR BLD AUTO: 3.5 % — SIGNIFICANT CHANGE UP (ref 0–6)
HCT VFR BLD CALC: 32.1 % — LOW (ref 39–50)
HCT VFR BLD CALC: SIGNIFICANT CHANGE UP % (ref 39–50)
HGB BLD-MCNC: 11.3 G/DL — LOW (ref 13–17)
HGB BLD-MCNC: 13.9 G/DL — SIGNIFICANT CHANGE UP (ref 13–17)
HYPOCHROMIA BLD QL: SLIGHT — SIGNIFICANT CHANGE UP
LYMPHOCYTES # BLD AUTO: 3.6 K/UL — HIGH (ref 1–3.3)
LYMPHOCYTES # BLD AUTO: 34 % — SIGNIFICANT CHANGE UP (ref 13–44)
LYMPHOCYTES # BLD AUTO: 48.8 % — HIGH (ref 13–44)
MACROCYTES BLD QL: SLIGHT — SIGNIFICANT CHANGE UP
MCHC RBC-ENTMCNC: 30.8 PG — SIGNIFICANT CHANGE UP (ref 27–34)
MCHC RBC-ENTMCNC: 35.4 G/DL — SIGNIFICANT CHANGE UP (ref 32–36)
MCHC RBC-ENTMCNC: SIGNIFICANT CHANGE UP (ref 27–34)
MCHC RBC-ENTMCNC: SIGNIFICANT CHANGE UP (ref 32–36)
MCV RBC AUTO: 87.2 FL — SIGNIFICANT CHANGE UP (ref 80–100)
MCV RBC AUTO: SIGNIFICANT CHANGE UP (ref 80–100)
MICROCYTES BLD QL: SLIGHT — SIGNIFICANT CHANGE UP
MONOCYTES # BLD AUTO: 0.7 K/UL — SIGNIFICANT CHANGE UP (ref 0–0.9)
MONOCYTES NFR BLD AUTO: 6 % — SIGNIFICANT CHANGE UP (ref 2–14)
MONOCYTES NFR BLD AUTO: 8.8 % — SIGNIFICANT CHANGE UP (ref 2–14)
NEUTROPHILS # BLD AUTO: 2.8 K/UL — SIGNIFICANT CHANGE UP (ref 1.8–7.4)
NEUTROPHILS # BLD AUTO: SIGNIFICANT CHANGE UP (ref 1.8–7.4)
NEUTROPHILS NFR BLD AUTO: 38.3 % — LOW (ref 43–77)
NEUTROPHILS NFR BLD AUTO: 58 % — SIGNIFICANT CHANGE UP (ref 43–77)
PLAT MORPH BLD: NORMAL — SIGNIFICANT CHANGE UP
PLATELET # BLD AUTO: 177 K/UL — SIGNIFICANT CHANGE UP (ref 150–400)
PLATELET # BLD AUTO: 267 K/UL — SIGNIFICANT CHANGE UP (ref 150–400)
POIKILOCYTOSIS BLD QL AUTO: SLIGHT — SIGNIFICANT CHANGE UP
POLYCHROMASIA BLD QL SMEAR: SLIGHT — SIGNIFICANT CHANGE UP
RBC # BLD: 3.68 M/UL — LOW (ref 4.2–5.8)
RBC # BLD: SIGNIFICANT CHANGE UP M/UL (ref 4.2–5.8)
RBC # FLD: 15.4 % — HIGH (ref 10.3–14.5)
RBC # FLD: SIGNIFICANT CHANGE UP % (ref 10.3–14.5)
RBC BLD AUTO: ABNORMAL
RETICS #: 248 K/UL — HIGH (ref 25–125)
RETICS/RBC NFR: 5.7 % — HIGH (ref 0.5–2.5)
RH IG SCN BLD-IMP: NEGATIVE — SIGNIFICANT CHANGE UP
WBC # BLD: 7.4 K/UL — SIGNIFICANT CHANGE UP (ref 3.8–10.5)
WBC # BLD: 9.7 K/UL — SIGNIFICANT CHANGE UP (ref 3.8–10.5)
WBC # FLD AUTO: 7.4 K/UL — SIGNIFICANT CHANGE UP (ref 3.8–10.5)
WBC # FLD AUTO: 9.7 K/UL — SIGNIFICANT CHANGE UP (ref 3.8–10.5)

## 2018-03-19 PROCEDURE — 99214 OFFICE O/P EST MOD 30 MIN: CPT

## 2018-03-20 ENCOUNTER — RESULT REVIEW (OUTPATIENT)
Age: 23
End: 2018-03-20

## 2018-03-20 LAB
ANTIBODY ID 1_1: SIGNIFICANT CHANGE UP
ANTIBODY ID 1_2: SIGNIFICANT CHANGE UP
ANTIBODY ID 1_3: SIGNIFICANT CHANGE UP
DAT C3-SP REAG RBC QL: NEGATIVE — SIGNIFICANT CHANGE UP
DAT POLY-SP REAG RBC QL: POSITIVE — SIGNIFICANT CHANGE UP
DIRECT COOMBS IGG: POSITIVE — SIGNIFICANT CHANGE UP

## 2018-03-20 PROCEDURE — 86077 PHYS BLOOD BANK SERV XMATCH: CPT

## 2018-03-22 LAB
HAPTOGLOB SERPL-MCNC: <20 MG/DL
LDH SERPL-CCNC: 269 U/L

## 2018-03-27 ENCOUNTER — APPOINTMENT (OUTPATIENT)
Dept: HEMATOLOGY ONCOLOGY | Facility: CLINIC | Age: 23
End: 2018-03-27

## 2018-04-06 ENCOUNTER — RESULT REVIEW (OUTPATIENT)
Age: 23
End: 2018-04-06

## 2018-04-06 ENCOUNTER — APPOINTMENT (OUTPATIENT)
Dept: HEMATOLOGY ONCOLOGY | Facility: CLINIC | Age: 23
End: 2018-04-06

## 2018-04-06 LAB
ALBUMIN SERPL ELPH-MCNC: 4.6 G/DL
ALP BLD-CCNC: 62 U/L
ALT SERPL-CCNC: 21 U/L
ANION GAP SERPL CALC-SCNC: 13 MMOL/L
ANISOCYTOSIS BLD QL: SLIGHT — SIGNIFICANT CHANGE UP
AST SERPL-CCNC: 42 U/L
BASO STIPL BLD QL SMEAR: PRESENT — SIGNIFICANT CHANGE UP
BASOPHILS # BLD AUTO: 0.1 K/UL — SIGNIFICANT CHANGE UP (ref 0–0.2)
BILIRUB SERPL-MCNC: 4.7 MG/DL
BLD GP AB SCN SERPL QL: POSITIVE — SIGNIFICANT CHANGE UP
BUN SERPL-MCNC: 22 MG/DL
CALCIUM SERPL-MCNC: 9.2 MG/DL
CHLORIDE SERPL-SCNC: 102 MMOL/L
CO2 SERPL-SCNC: 26 MMOL/L
CREAT SERPL-MCNC: 1.04 MG/DL
EOSINOPHIL # BLD AUTO: 0.3 K/UL — SIGNIFICANT CHANGE UP (ref 0–0.5)
GLUCOSE SERPL-MCNC: 110 MG/DL
HAPTOGLOB SERPL-MCNC: <20 MG/DL
HCT VFR BLD CALC: 25.3 % — LOW (ref 39–50)
HGB BLD-MCNC: 9.8 G/DL — LOW (ref 13–17)
HYPOCHROMIA BLD QL: SLIGHT — SIGNIFICANT CHANGE UP
LDH SERPL-CCNC: 815 U/L
LYMPHOCYTES # BLD AUTO: 23 % — SIGNIFICANT CHANGE UP (ref 13–44)
LYMPHOCYTES # BLD AUTO: 3.6 K/UL — HIGH (ref 1–3.3)
MACROCYTES BLD QL: SLIGHT — SIGNIFICANT CHANGE UP
MCHC RBC-ENTMCNC: 34.3 PG — HIGH (ref 27–34)
MCHC RBC-ENTMCNC: 38.8 G/DL — HIGH (ref 32–36)
MCV RBC AUTO: 88.4 FL — SIGNIFICANT CHANGE UP (ref 80–100)
MICROCYTES BLD QL: SLIGHT — SIGNIFICANT CHANGE UP
MONOCYTES # BLD AUTO: 0.8 K/UL — SIGNIFICANT CHANGE UP (ref 0–0.9)
MONOCYTES NFR BLD AUTO: 6 % — SIGNIFICANT CHANGE UP (ref 2–14)
NEUTROPHILS # BLD AUTO: 11.2 K/UL — HIGH (ref 1.8–7.4)
NEUTROPHILS NFR BLD AUTO: 71 % — SIGNIFICANT CHANGE UP (ref 43–77)
PLAT MORPH BLD: NORMAL — SIGNIFICANT CHANGE UP
PLATELET # BLD AUTO: 258 K/UL — SIGNIFICANT CHANGE UP (ref 150–400)
POIKILOCYTOSIS BLD QL AUTO: SLIGHT — SIGNIFICANT CHANGE UP
POLYCHROMASIA BLD QL SMEAR: SLIGHT — SIGNIFICANT CHANGE UP
POTASSIUM SERPL-SCNC: 4 MMOL/L
PROT SERPL-MCNC: 7.1 G/DL
RBC # BLD: 2.86 M/UL — LOW (ref 4.2–5.8)
RBC # FLD: 21.1 % — HIGH (ref 10.3–14.5)
RBC BLD AUTO: ABNORMAL
RETICS #: 603 K/UL — HIGH (ref 25–125)
RETICS/RBC NFR: 21.1 % — HIGH (ref 0.5–2.5)
RH IG SCN BLD-IMP: NEGATIVE — SIGNIFICANT CHANGE UP
SODIUM SERPL-SCNC: 141 MMOL/L
WBC # BLD: 14.9 K/UL — HIGH (ref 3.8–10.5)
WBC # FLD AUTO: 14.9 K/UL — HIGH (ref 3.8–10.5)

## 2018-04-10 ENCOUNTER — RESULT REVIEW (OUTPATIENT)
Age: 23
End: 2018-04-10

## 2018-04-10 ENCOUNTER — LABORATORY RESULT (OUTPATIENT)
Age: 23
End: 2018-04-10

## 2018-04-10 ENCOUNTER — APPOINTMENT (OUTPATIENT)
Dept: HEMATOLOGY ONCOLOGY | Facility: CLINIC | Age: 23
End: 2018-04-10
Payer: COMMERCIAL

## 2018-04-10 VITALS
TEMPERATURE: 98.8 F | WEIGHT: 238.1 LBS | HEART RATE: 105 BPM | RESPIRATION RATE: 16 BRPM | DIASTOLIC BLOOD PRESSURE: 92 MMHG | SYSTOLIC BLOOD PRESSURE: 157 MMHG | OXYGEN SATURATION: 97 %

## 2018-04-10 LAB
BASOPHILS # BLD AUTO: 0 K/UL — SIGNIFICANT CHANGE UP (ref 0–0.2)
BASOPHILS NFR BLD AUTO: 0.3 % — SIGNIFICANT CHANGE UP (ref 0–2)
EOSINOPHIL # BLD AUTO: 0.4 K/UL — SIGNIFICANT CHANGE UP (ref 0–0.5)
EOSINOPHIL NFR BLD AUTO: 2.7 % — SIGNIFICANT CHANGE UP (ref 0–6)
HCT VFR BLD CALC: SIGNIFICANT CHANGE UP % (ref 39–50)
HGB BLD-MCNC: 9.5 G/DL — LOW (ref 13–17)
LYMPHOCYTES # BLD AUTO: 33.4 % — SIGNIFICANT CHANGE UP (ref 13–44)
LYMPHOCYTES # BLD AUTO: 5.2 K/UL — HIGH (ref 1–3.3)
MCHC RBC-ENTMCNC: SIGNIFICANT CHANGE UP (ref 27–34)
MCHC RBC-ENTMCNC: SIGNIFICANT CHANGE UP (ref 32–36)
MCV RBC AUTO: SIGNIFICANT CHANGE UP (ref 80–100)
MONOCYTES # BLD AUTO: 0.9 K/UL — SIGNIFICANT CHANGE UP (ref 0–0.9)
MONOCYTES NFR BLD AUTO: 5.8 % — SIGNIFICANT CHANGE UP (ref 2–14)
NEUTROPHILS # BLD AUTO: 9.1 K/UL — HIGH (ref 1.8–7.4)
NEUTROPHILS NFR BLD AUTO: 57.8 % — SIGNIFICANT CHANGE UP (ref 43–77)
PLATELET # BLD AUTO: 303 K/UL — SIGNIFICANT CHANGE UP (ref 150–400)
RBC # BLD: SIGNIFICANT CHANGE UP M/UL (ref 4.2–5.8)
RBC # FLD: SIGNIFICANT CHANGE UP % (ref 10.3–14.5)
WBC # BLD: 15.7 K/UL — HIGH (ref 3.8–10.5)
WBC # FLD AUTO: 15.7 K/UL — HIGH (ref 3.8–10.5)

## 2018-04-10 PROCEDURE — 99215 OFFICE O/P EST HI 40 MIN: CPT

## 2018-04-10 PROCEDURE — 86922 COMPATIBILITY TEST ANTIGLOB: CPT

## 2018-04-10 PROCEDURE — 88185 FLOWCYTOMETRY/TC ADD-ON: CPT

## 2018-04-10 PROCEDURE — 86880 COOMBS TEST DIRECT: CPT

## 2018-04-10 PROCEDURE — 86901 BLOOD TYPING SEROLOGIC RH(D): CPT

## 2018-04-10 PROCEDURE — 88184 FLOWCYTOMETRY/ TC 1 MARKER: CPT

## 2018-04-10 PROCEDURE — 88189 FLOWCYTOMETRY/READ 16 & >: CPT

## 2018-04-10 PROCEDURE — 86870 RBC ANTIBODY IDENTIFICATION: CPT

## 2018-04-10 PROCEDURE — 86900 BLOOD TYPING SEROLOGIC ABO: CPT

## 2018-04-10 PROCEDURE — 86850 RBC ANTIBODY SCREEN: CPT

## 2018-04-11 LAB
ALBUMIN SERPL ELPH-MCNC: 4.6 G/DL
ALP BLD-CCNC: 67 U/L
ALT SERPL-CCNC: 24 U/L
ANION GAP SERPL CALC-SCNC: 14 MMOL/L
AST SERPL-CCNC: 55 U/L
BILIRUB SERPL-MCNC: 5.3 MG/DL
BUN SERPL-MCNC: 19 MG/DL
CALCIUM SERPL-MCNC: 9.3 MG/DL
CHLORIDE SERPL-SCNC: 101 MMOL/L
CO2 SERPL-SCNC: 25 MMOL/L
CREAT SERPL-MCNC: 0.99 MG/DL
GLUCOSE SERPL-MCNC: 99 MG/DL
HAPTOGLOB SERPL-MCNC: <20 MG/DL
HAV IGM SER QL: NONREACTIVE
HBV CORE IGG+IGM SER QL: NONREACTIVE
HBV CORE IGM SER QL: NONREACTIVE
HBV SURFACE AG SER QL: NONREACTIVE
HCV AB SER QL: NONREACTIVE
HCV S/CO RATIO: 0.15 S/CO
HIV1+2 AB SPEC QL IA.RAPID: NONREACTIVE
LDH SERPL-CCNC: 1146 U/L
POTASSIUM SERPL-SCNC: 4.3 MMOL/L
PROT SERPL-MCNC: 7.6 G/DL
SODIUM SERPL-SCNC: 140 MMOL/L
TM INTERPRETATION: SIGNIFICANT CHANGE UP

## 2018-04-12 LAB
ABR COMMENT: NORMAL
CA SERPL QL: NORMAL
PNH GRANULOCYTES: 0 %
PNH MONOCYTES: 0 %
PNH RBC - COMPLETE: 0 %
PNH RBC - PARTIAL: 0 %

## 2018-04-13 ENCOUNTER — RESULT REVIEW (OUTPATIENT)
Age: 23
End: 2018-04-13

## 2018-04-13 ENCOUNTER — APPOINTMENT (OUTPATIENT)
Dept: INFUSION THERAPY | Facility: HOSPITAL | Age: 23
End: 2018-04-13

## 2018-04-13 LAB
AGGLUTINATION: PRESENT — SIGNIFICANT CHANGE UP
ANISOCYTOSIS BLD QL: SLIGHT — SIGNIFICANT CHANGE UP
BASO STIPL BLD QL SMEAR: PRESENT — SIGNIFICANT CHANGE UP
BASOPHILS # BLD AUTO: 0.1 K/UL — SIGNIFICANT CHANGE UP (ref 0–0.2)
DACRYOCYTES BLD QL SMEAR: SLIGHT — SIGNIFICANT CHANGE UP
ELLIPTOCYTES BLD QL SMEAR: SLIGHT — SIGNIFICANT CHANGE UP
EOSINOPHIL # BLD AUTO: 0.4 K/UL — SIGNIFICANT CHANGE UP (ref 0–0.5)
EOSINOPHIL NFR BLD AUTO: 1 % — SIGNIFICANT CHANGE UP (ref 0–6)
HCT VFR BLD CALC: 23.3 % — LOW (ref 39–50)
HGB BLD-MCNC: 9 G/DL — LOW (ref 13–17)
HYPOCHROMIA BLD QL: SLIGHT — SIGNIFICANT CHANGE UP
LYMPHOCYTES # BLD AUTO: 32 % — SIGNIFICANT CHANGE UP (ref 13–44)
LYMPHOCYTES # BLD AUTO: 4.9 K/UL — HIGH (ref 1–3.3)
MACROCYTES BLD QL: SLIGHT — SIGNIFICANT CHANGE UP
MCHC RBC-ENTMCNC: 38.4 G/DL — HIGH (ref 32–36)
MCHC RBC-ENTMCNC: 39 PG — HIGH (ref 27–34)
MCV RBC AUTO: 102 FL — HIGH (ref 80–100)
MICROCYTES BLD QL: SLIGHT — SIGNIFICANT CHANGE UP
MONOCYTES # BLD AUTO: 0.9 K/UL — SIGNIFICANT CHANGE UP (ref 0–0.9)
MONOCYTES NFR BLD AUTO: 12 % — SIGNIFICANT CHANGE UP (ref 2–14)
NEUTROPHILS # BLD AUTO: 9.9 K/UL — HIGH (ref 1.8–7.4)
NEUTROPHILS NFR BLD AUTO: 55 % — SIGNIFICANT CHANGE UP (ref 43–77)
NRBC # BLD: 6 /100 — HIGH (ref 0–0)
PLAT MORPH BLD: NORMAL — SIGNIFICANT CHANGE UP
PLATELET # BLD AUTO: 300 K/UL — SIGNIFICANT CHANGE UP (ref 150–400)
POIKILOCYTOSIS BLD QL AUTO: SLIGHT — SIGNIFICANT CHANGE UP
POLYCHROMASIA BLD QL SMEAR: SLIGHT — SIGNIFICANT CHANGE UP
RBC # BLD: 2.3 M/UL — LOW (ref 4.2–5.8)
RBC # FLD: 27.7 % — HIGH (ref 10.3–14.5)
RBC BLD AUTO: ABNORMAL
WBC # BLD: 16.1 K/UL — HIGH (ref 3.8–10.5)
WBC # FLD AUTO: 16.1 K/UL — HIGH (ref 3.8–10.5)

## 2018-04-13 PROCEDURE — 93010 ELECTROCARDIOGRAM REPORT: CPT

## 2018-04-16 DIAGNOSIS — R11.2 NAUSEA WITH VOMITING, UNSPECIFIED: ICD-10-CM

## 2018-04-16 DIAGNOSIS — Z51.11 ENCOUNTER FOR ANTINEOPLASTIC CHEMOTHERAPY: ICD-10-CM

## 2018-04-17 ENCOUNTER — OUTPATIENT (OUTPATIENT)
Dept: OUTPATIENT SERVICES | Facility: HOSPITAL | Age: 23
LOS: 1 days | Discharge: HOME | End: 2018-04-17

## 2018-04-17 ENCOUNTER — APPOINTMENT (OUTPATIENT)
Dept: SURGICAL ONCOLOGY | Facility: CLINIC | Age: 23
End: 2018-04-17
Payer: COMMERCIAL

## 2018-04-17 VITALS
HEIGHT: 70 IN | DIASTOLIC BLOOD PRESSURE: 80 MMHG | HEART RATE: 82 BPM | WEIGHT: 234 LBS | OXYGEN SATURATION: 97 % | SYSTOLIC BLOOD PRESSURE: 125 MMHG | BODY MASS INDEX: 33.5 KG/M2

## 2018-04-17 DIAGNOSIS — R22.2 LOCALIZED SWELLING, MASS AND LUMP, TRUNK: ICD-10-CM

## 2018-04-17 DIAGNOSIS — D59.1 OTHER AUTOIMMUNE HEMOLYTIC ANEMIAS: ICD-10-CM

## 2018-04-17 PROCEDURE — 99214 OFFICE O/P EST MOD 30 MIN: CPT

## 2018-04-19 PROBLEM — R22.2 MASS OF ANTERIOR ABDOMINAL WALL: Status: ACTIVE | Noted: 2018-02-01

## 2018-04-20 ENCOUNTER — APPOINTMENT (OUTPATIENT)
Dept: INFUSION THERAPY | Facility: HOSPITAL | Age: 23
End: 2018-04-20

## 2018-04-20 ENCOUNTER — RESULT REVIEW (OUTPATIENT)
Age: 23
End: 2018-04-20

## 2018-04-20 LAB
ANISOCYTOSIS BLD QL: SLIGHT — SIGNIFICANT CHANGE UP
BASOPHILS # BLD AUTO: 0 K/UL — SIGNIFICANT CHANGE UP (ref 0–0.2)
BASOPHILS NFR BLD AUTO: 1 % — SIGNIFICANT CHANGE UP (ref 0–2)
EOSINOPHIL # BLD AUTO: 0.1 K/UL — SIGNIFICANT CHANGE UP (ref 0–0.5)
EOSINOPHIL NFR BLD AUTO: 1 % — SIGNIFICANT CHANGE UP (ref 0–6)
HCT VFR BLD CALC: 26.1 % — LOW (ref 39–50)
HGB BLD-MCNC: 9.8 G/DL — LOW (ref 13–17)
HYPOCHROMIA BLD QL: SLIGHT — SIGNIFICANT CHANGE UP
LYMPHOCYTES # BLD AUTO: 1.7 K/UL — SIGNIFICANT CHANGE UP (ref 1–3.3)
LYMPHOCYTES # BLD AUTO: 21 % — SIGNIFICANT CHANGE UP (ref 13–44)
MACROCYTES BLD QL: SLIGHT — SIGNIFICANT CHANGE UP
MCHC RBC-ENTMCNC: 37.5 G/DL — HIGH (ref 32–36)
MCHC RBC-ENTMCNC: 38.4 PG — HIGH (ref 27–34)
MCV RBC AUTO: 102 FL — HIGH (ref 80–100)
MICROCYTES BLD QL: SLIGHT — SIGNIFICANT CHANGE UP
MONOCYTES # BLD AUTO: 0.5 K/UL — SIGNIFICANT CHANGE UP (ref 0–0.9)
MONOCYTES NFR BLD AUTO: 8 % — SIGNIFICANT CHANGE UP (ref 2–14)
NEUTROPHILS # BLD AUTO: 10.1 K/UL — HIGH (ref 1.8–7.4)
NEUTROPHILS NFR BLD AUTO: 69 % — SIGNIFICANT CHANGE UP (ref 43–77)
NRBC # BLD: 1 /100 — HIGH (ref 0–0)
PLAT MORPH BLD: NORMAL — SIGNIFICANT CHANGE UP
PLATELET # BLD AUTO: 293 K/UL — SIGNIFICANT CHANGE UP (ref 150–400)
POIKILOCYTOSIS BLD QL AUTO: SLIGHT — SIGNIFICANT CHANGE UP
POLYCHROMASIA BLD QL SMEAR: SIGNIFICANT CHANGE UP
RBC # BLD: 2.55 M/UL — LOW (ref 4.2–5.8)
RBC # FLD: 20.6 % — HIGH (ref 10.3–14.5)
RBC BLD AUTO: ABNORMAL
WBC # BLD: 12.5 K/UL — HIGH (ref 3.8–10.5)
WBC # FLD AUTO: 12.5 K/UL — HIGH (ref 3.8–10.5)

## 2018-04-23 DIAGNOSIS — R11.2 NAUSEA WITH VOMITING, UNSPECIFIED: ICD-10-CM

## 2018-04-23 DIAGNOSIS — Z51.11 ENCOUNTER FOR ANTINEOPLASTIC CHEMOTHERAPY: ICD-10-CM

## 2018-04-27 ENCOUNTER — LABORATORY RESULT (OUTPATIENT)
Age: 23
End: 2018-04-27

## 2018-04-27 ENCOUNTER — APPOINTMENT (OUTPATIENT)
Dept: INFUSION THERAPY | Facility: HOSPITAL | Age: 23
End: 2018-04-27

## 2018-04-27 ENCOUNTER — RESULT REVIEW (OUTPATIENT)
Age: 23
End: 2018-04-27

## 2018-04-27 LAB
BASOPHILS # BLD AUTO: 0.1 K/UL — SIGNIFICANT CHANGE UP (ref 0–0.2)
BASOPHILS NFR BLD AUTO: 0.4 % — SIGNIFICANT CHANGE UP (ref 0–2)
EOSINOPHIL # BLD AUTO: 0.2 K/UL — SIGNIFICANT CHANGE UP (ref 0–0.5)
EOSINOPHIL NFR BLD AUTO: 1 % — SIGNIFICANT CHANGE UP (ref 0–6)
HCT VFR BLD CALC: 29.5 % — LOW (ref 39–50)
HGB BLD-MCNC: 11.1 G/DL — LOW (ref 13–17)
LYMPHOCYTES # BLD AUTO: 16.7 % — SIGNIFICANT CHANGE UP (ref 13–44)
LYMPHOCYTES # BLD AUTO: 2.5 K/UL — SIGNIFICANT CHANGE UP (ref 1–3.3)
MCHC RBC-ENTMCNC: 37.7 G/DL — HIGH (ref 32–36)
MCHC RBC-ENTMCNC: 38.5 PG — HIGH (ref 27–34)
MCV RBC AUTO: 102 FL — HIGH (ref 80–100)
MONOCYTES # BLD AUTO: 0.9 K/UL — SIGNIFICANT CHANGE UP (ref 0–0.9)
MONOCYTES NFR BLD AUTO: 5.8 % — SIGNIFICANT CHANGE UP (ref 2–14)
NEUTROPHILS # BLD AUTO: 11.6 K/UL — HIGH (ref 1.8–7.4)
NEUTROPHILS NFR BLD AUTO: 76.2 % — SIGNIFICANT CHANGE UP (ref 43–77)
PLATELET # BLD AUTO: 286 K/UL — SIGNIFICANT CHANGE UP (ref 150–400)
RBC # BLD: 2.89 M/UL — LOW (ref 4.2–5.8)
RBC # FLD: 19.6 % — HIGH (ref 10.3–14.5)
WBC # BLD: 15.3 K/UL — HIGH (ref 3.8–10.5)
WBC # FLD AUTO: 15.3 K/UL — HIGH (ref 3.8–10.5)

## 2018-04-28 ENCOUNTER — MOBILE ON CALL (OUTPATIENT)
Age: 23
End: 2018-04-28

## 2018-05-04 ENCOUNTER — APPOINTMENT (OUTPATIENT)
Dept: INFUSION THERAPY | Facility: HOSPITAL | Age: 23
End: 2018-05-04

## 2018-05-04 ENCOUNTER — RESULT REVIEW (OUTPATIENT)
Age: 23
End: 2018-05-04

## 2018-05-04 LAB
BASOPHILS # BLD AUTO: 0 K/UL — SIGNIFICANT CHANGE UP (ref 0–0.2)
BASOPHILS NFR BLD AUTO: 0.1 % — SIGNIFICANT CHANGE UP (ref 0–2)
EOSINOPHIL # BLD AUTO: 0 K/UL — SIGNIFICANT CHANGE UP (ref 0–0.5)
EOSINOPHIL NFR BLD AUTO: 0.3 % — SIGNIFICANT CHANGE UP (ref 0–6)
HCT VFR BLD CALC: 30.5 % — LOW (ref 39–50)
HGB BLD-MCNC: 11.5 G/DL — LOW (ref 13–17)
LYMPHOCYTES # BLD AUTO: 1.2 K/UL — SIGNIFICANT CHANGE UP (ref 1–3.3)
LYMPHOCYTES # BLD AUTO: 7.9 % — LOW (ref 13–44)
MCHC RBC-ENTMCNC: 37.6 G/DL — HIGH (ref 32–36)
MCHC RBC-ENTMCNC: 38.2 PG — HIGH (ref 27–34)
MCV RBC AUTO: 102 FL — HIGH (ref 80–100)
MONOCYTES # BLD AUTO: 0.4 K/UL — SIGNIFICANT CHANGE UP (ref 0–0.9)
MONOCYTES NFR BLD AUTO: 2.7 % — SIGNIFICANT CHANGE UP (ref 2–14)
NEUTROPHILS # BLD AUTO: 13 K/UL — HIGH (ref 1.8–7.4)
NEUTROPHILS NFR BLD AUTO: 89 % — HIGH (ref 43–77)
PLATELET # BLD AUTO: 311 K/UL — SIGNIFICANT CHANGE UP (ref 150–400)
RBC # BLD: 3 M/UL — LOW (ref 4.2–5.8)
RBC # FLD: 17.7 % — HIGH (ref 10.3–14.5)
WBC # BLD: 14.6 K/UL — HIGH (ref 3.8–10.5)
WBC # FLD AUTO: 14.6 K/UL — HIGH (ref 3.8–10.5)

## 2018-05-24 ENCOUNTER — OUTPATIENT (OUTPATIENT)
Dept: OUTPATIENT SERVICES | Facility: HOSPITAL | Age: 23
LOS: 1 days | Discharge: HOME | End: 2018-05-24

## 2018-05-24 DIAGNOSIS — D59.1 OTHER AUTOIMMUNE HEMOLYTIC ANEMIAS: ICD-10-CM

## 2018-05-25 ENCOUNTER — RESULT REVIEW (OUTPATIENT)
Age: 23
End: 2018-05-25

## 2018-05-25 ENCOUNTER — APPOINTMENT (OUTPATIENT)
Dept: HEMATOLOGY ONCOLOGY | Facility: CLINIC | Age: 23
End: 2018-05-25
Payer: COMMERCIAL

## 2018-05-25 VITALS
SYSTOLIC BLOOD PRESSURE: 114 MMHG | HEART RATE: 76 BPM | OXYGEN SATURATION: 98 % | DIASTOLIC BLOOD PRESSURE: 76 MMHG | TEMPERATURE: 99 F | RESPIRATION RATE: 16 BRPM | WEIGHT: 237.99 LBS

## 2018-05-25 LAB
BASOPHILS # BLD AUTO: 0 K/UL — SIGNIFICANT CHANGE UP (ref 0–0.2)
BASOPHILS NFR BLD AUTO: 0.2 % — SIGNIFICANT CHANGE UP (ref 0–2)
EOSINOPHIL # BLD AUTO: 0.1 K/UL — SIGNIFICANT CHANGE UP (ref 0–0.5)
EOSINOPHIL NFR BLD AUTO: 0.9 % — SIGNIFICANT CHANGE UP (ref 0–6)
HCT VFR BLD CALC: 35.4 % — LOW (ref 39–50)
HGB BLD-MCNC: 13.3 G/DL — SIGNIFICANT CHANGE UP (ref 13–17)
LYMPHOCYTES # BLD AUTO: 1.2 K/UL — SIGNIFICANT CHANGE UP (ref 1–3.3)
LYMPHOCYTES # BLD AUTO: 11 % — LOW (ref 13–44)
MCHC RBC-ENTMCNC: 36.3 PG — HIGH (ref 27–34)
MCHC RBC-ENTMCNC: 37.7 G/DL — HIGH (ref 32–36)
MCV RBC AUTO: 96.2 FL — SIGNIFICANT CHANGE UP (ref 80–100)
MONOCYTES # BLD AUTO: 0.3 K/UL — SIGNIFICANT CHANGE UP (ref 0–0.9)
MONOCYTES NFR BLD AUTO: 3.2 % — SIGNIFICANT CHANGE UP (ref 2–14)
NEUTROPHILS # BLD AUTO: 9 K/UL — HIGH (ref 1.8–7.4)
NEUTROPHILS NFR BLD AUTO: 84.7 % — HIGH (ref 43–77)
PLATELET # BLD AUTO: 279 K/UL — SIGNIFICANT CHANGE UP (ref 150–400)
RBC # BLD: 3.68 M/UL — LOW (ref 4.2–5.8)
RBC # FLD: 14.2 % — SIGNIFICANT CHANGE UP (ref 10.3–14.5)
RETICS #: 211 K/UL — HIGH (ref 25–125)
RETICS/RBC NFR: 5.7 % — HIGH (ref 0.5–2.5)
WBC # BLD: 10.7 K/UL — HIGH (ref 3.8–10.5)
WBC # FLD AUTO: 10.7 K/UL — HIGH (ref 3.8–10.5)

## 2018-05-25 PROCEDURE — 99214 OFFICE O/P EST MOD 30 MIN: CPT

## 2018-05-29 LAB
ALBUMIN SERPL ELPH-MCNC: 4.9 G/DL
ALP BLD-CCNC: 50 U/L
ALT SERPL-CCNC: 38 U/L
ANION GAP SERPL CALC-SCNC: 13 MMOL/L
AST SERPL-CCNC: 27 U/L
BILIRUB SERPL-MCNC: 1.9 MG/DL
BUN SERPL-MCNC: 19 MG/DL
CALCIUM SERPL-MCNC: 9.6 MG/DL
CHLORIDE SERPL-SCNC: 102 MMOL/L
CO2 SERPL-SCNC: 26 MMOL/L
CREAT SERPL-MCNC: 1 MG/DL
GLUCOSE SERPL-MCNC: 99 MG/DL
HAPTOGLOB SERPL-MCNC: <20 MG/DL
LDH SERPL-CCNC: 395 U/L
POTASSIUM SERPL-SCNC: 4.3 MMOL/L
PROT SERPL-MCNC: 7.3 G/DL
SODIUM SERPL-SCNC: 141 MMOL/L

## 2018-05-31 ENCOUNTER — LABORATORY RESULT (OUTPATIENT)
Age: 23
End: 2018-05-31

## 2018-06-08 ENCOUNTER — LABORATORY RESULT (OUTPATIENT)
Age: 23
End: 2018-06-08

## 2018-06-14 ENCOUNTER — LABORATORY RESULT (OUTPATIENT)
Age: 23
End: 2018-06-14

## 2018-06-20 ENCOUNTER — LABORATORY RESULT (OUTPATIENT)
Age: 23
End: 2018-06-20

## 2018-06-22 ENCOUNTER — RESULT REVIEW (OUTPATIENT)
Age: 23
End: 2018-06-22

## 2018-06-22 ENCOUNTER — APPOINTMENT (OUTPATIENT)
Dept: HEMATOLOGY ONCOLOGY | Facility: CLINIC | Age: 23
End: 2018-06-22
Payer: COMMERCIAL

## 2018-06-22 VITALS
SYSTOLIC BLOOD PRESSURE: 124 MMHG | HEART RATE: 80 BPM | TEMPERATURE: 98 F | RESPIRATION RATE: 16 BRPM | WEIGHT: 242.51 LBS | OXYGEN SATURATION: 98 % | DIASTOLIC BLOOD PRESSURE: 76 MMHG

## 2018-06-22 LAB
ALBUMIN SERPL ELPH-MCNC: 4.5 G/DL
ALP BLD-CCNC: 74 U/L
ALT SERPL-CCNC: 23 U/L
ANION GAP SERPL CALC-SCNC: 14 MMOL/L
AST SERPL-CCNC: 19 U/L
BASOPHILS # BLD AUTO: 0 K/UL — SIGNIFICANT CHANGE UP (ref 0–0.2)
BASOPHILS NFR BLD AUTO: 0.3 % — SIGNIFICANT CHANGE UP (ref 0–2)
BILIRUB SERPL-MCNC: 0.9 MG/DL
BUN SERPL-MCNC: 14 MG/DL
CALCIUM SERPL-MCNC: 9.5 MG/DL
CHLORIDE SERPL-SCNC: 101 MMOL/L
CO2 SERPL-SCNC: 27 MMOL/L
CREAT SERPL-MCNC: 1.03 MG/DL
EOSINOPHIL # BLD AUTO: 0.3 K/UL — SIGNIFICANT CHANGE UP (ref 0–0.5)
EOSINOPHIL NFR BLD AUTO: 2.4 % — SIGNIFICANT CHANGE UP (ref 0–6)
GLUCOSE SERPL-MCNC: 108 MG/DL
HAPTOGLOB SERPL-MCNC: 67 MG/DL
HCT VFR BLD CALC: 40.9 % — SIGNIFICANT CHANGE UP (ref 39–50)
HGB BLD-MCNC: 15.3 G/DL — SIGNIFICANT CHANGE UP (ref 13–17)
LDH SERPL-CCNC: 198 U/L
LYMPHOCYTES # BLD AUTO: 2.4 K/UL — SIGNIFICANT CHANGE UP (ref 1–3.3)
LYMPHOCYTES # BLD AUTO: 20.9 % — SIGNIFICANT CHANGE UP (ref 13–44)
MCHC RBC-ENTMCNC: 33.4 PG — SIGNIFICANT CHANGE UP (ref 27–34)
MCHC RBC-ENTMCNC: 37.5 G/DL — HIGH (ref 32–36)
MCV RBC AUTO: 89.2 FL — SIGNIFICANT CHANGE UP (ref 80–100)
MONOCYTES # BLD AUTO: 0.6 K/UL — SIGNIFICANT CHANGE UP (ref 0–0.9)
MONOCYTES NFR BLD AUTO: 5.6 % — SIGNIFICANT CHANGE UP (ref 2–14)
NEUTROPHILS # BLD AUTO: 8 K/UL — HIGH (ref 1.8–7.4)
NEUTROPHILS NFR BLD AUTO: 70.8 % — SIGNIFICANT CHANGE UP (ref 43–77)
PLATELET # BLD AUTO: 258 K/UL — SIGNIFICANT CHANGE UP (ref 150–400)
POTASSIUM SERPL-SCNC: 3.9 MMOL/L
PROT SERPL-MCNC: 7.5 G/DL
RBC # BLD: 4.59 M/UL — SIGNIFICANT CHANGE UP (ref 4.2–5.8)
RBC # FLD: 12 % — SIGNIFICANT CHANGE UP (ref 10.3–14.5)
RETICS #: 92.1 K/UL — SIGNIFICANT CHANGE UP (ref 25–125)
RETICS/RBC NFR: 2 % — SIGNIFICANT CHANGE UP (ref 0.5–2.5)
SODIUM SERPL-SCNC: 142 MMOL/L
WBC # BLD: 11.4 K/UL — HIGH (ref 3.8–10.5)
WBC # FLD AUTO: 11.4 K/UL — HIGH (ref 3.8–10.5)

## 2018-06-22 PROCEDURE — 99215 OFFICE O/P EST HI 40 MIN: CPT

## 2018-06-23 ENCOUNTER — MOBILE ON CALL (OUTPATIENT)
Age: 23
End: 2018-06-23

## 2018-08-17 PROBLEM — B27.90 INFECTIOUS MONONUCLEOSIS, UNSPECIFIED WITHOUT COMPLICATION: Chronic | Status: ACTIVE | Noted: 2017-12-22

## 2018-08-28 ENCOUNTER — APPOINTMENT (OUTPATIENT)
Dept: SURGICAL ONCOLOGY | Facility: CLINIC | Age: 23
End: 2018-08-28

## 2018-09-07 ENCOUNTER — OUTPATIENT (OUTPATIENT)
Dept: OUTPATIENT SERVICES | Facility: HOSPITAL | Age: 23
LOS: 1 days | Discharge: HOME | End: 2018-09-07

## 2018-09-07 DIAGNOSIS — D50.9 IRON DEFICIENCY ANEMIA, UNSPECIFIED: ICD-10-CM

## 2018-09-13 ENCOUNTER — RESULT REVIEW (OUTPATIENT)
Age: 23
End: 2018-09-13

## 2018-09-13 ENCOUNTER — APPOINTMENT (OUTPATIENT)
Dept: HEMATOLOGY ONCOLOGY | Facility: CLINIC | Age: 23
End: 2018-09-13
Payer: COMMERCIAL

## 2018-09-13 VITALS
HEART RATE: 67 BPM | RESPIRATION RATE: 16 BRPM | WEIGHT: 234.33 LBS | DIASTOLIC BLOOD PRESSURE: 84 MMHG | TEMPERATURE: 99.1 F | BODY MASS INDEX: 32.81 KG/M2 | SYSTOLIC BLOOD PRESSURE: 123 MMHG | OXYGEN SATURATION: 98 % | HEIGHT: 71 IN

## 2018-09-13 LAB
BASOPHILS # BLD AUTO: 0 K/UL — SIGNIFICANT CHANGE UP (ref 0–0.2)
BASOPHILS NFR BLD AUTO: 0.3 % — SIGNIFICANT CHANGE UP (ref 0–2)
EOSINOPHIL # BLD AUTO: 0.1 K/UL — SIGNIFICANT CHANGE UP (ref 0–0.5)
EOSINOPHIL NFR BLD AUTO: 2.3 % — SIGNIFICANT CHANGE UP (ref 0–6)
HCT VFR BLD CALC: 48 % — SIGNIFICANT CHANGE UP (ref 39–50)
HGB BLD-MCNC: 16.8 G/DL — SIGNIFICANT CHANGE UP (ref 13–17)
LYMPHOCYTES # BLD AUTO: 2.7 K/UL — SIGNIFICANT CHANGE UP (ref 1–3.3)
LYMPHOCYTES # BLD AUTO: 46.7 % — HIGH (ref 13–44)
MCHC RBC-ENTMCNC: 29.3 PG — SIGNIFICANT CHANGE UP (ref 27–34)
MCHC RBC-ENTMCNC: 35 G/DL — SIGNIFICANT CHANGE UP (ref 32–36)
MCV RBC AUTO: 83.8 FL — SIGNIFICANT CHANGE UP (ref 80–100)
MONOCYTES # BLD AUTO: 0.7 K/UL — SIGNIFICANT CHANGE UP (ref 0–0.9)
MONOCYTES NFR BLD AUTO: 12.4 % — SIGNIFICANT CHANGE UP (ref 2–14)
NEUTROPHILS # BLD AUTO: 2.2 K/UL — SIGNIFICANT CHANGE UP (ref 1.8–7.4)
NEUTROPHILS NFR BLD AUTO: 38.3 % — LOW (ref 43–77)
PLATELET # BLD AUTO: 285 K/UL — SIGNIFICANT CHANGE UP (ref 150–400)
RBC # BLD: 5.73 M/UL — SIGNIFICANT CHANGE UP (ref 4.2–5.8)
RBC # FLD: 12.8 % — SIGNIFICANT CHANGE UP (ref 10.3–14.5)
WBC # BLD: 5.9 K/UL — SIGNIFICANT CHANGE UP (ref 3.8–10.5)
WBC # FLD AUTO: 5.9 K/UL — SIGNIFICANT CHANGE UP (ref 3.8–10.5)

## 2018-09-13 PROCEDURE — 99215 OFFICE O/P EST HI 40 MIN: CPT

## 2018-09-13 RX ORDER — FOLIC ACID 1 MG/1
1 TABLET ORAL DAILY
Qty: 30 | Refills: 3 | Status: COMPLETED | COMMUNITY
Start: 2018-01-05 | End: 2018-09-13

## 2018-09-13 RX ORDER — OMEPRAZOLE 20 MG/1
20 CAPSULE, DELAYED RELEASE ORAL
Qty: 14 | Refills: 0 | Status: COMPLETED | COMMUNITY
Start: 2017-12-24 | End: 2018-09-13

## 2018-09-13 RX ORDER — PREDNISONE 50 MG/1
TABLET ORAL
Refills: 0 | Status: COMPLETED | COMMUNITY
End: 2018-09-13

## 2018-09-13 RX ORDER — PREDNISONE 20 MG/1
20 TABLET ORAL
Qty: 120 | Refills: 3 | Status: COMPLETED | COMMUNITY
Start: 2018-01-05 | End: 2018-09-13

## 2018-10-16 LAB
ALBUMIN SERPL ELPH-MCNC: 5 G/DL
ALP BLD-CCNC: 96 U/L
ALT SERPL-CCNC: 22 U/L
ANION GAP SERPL CALC-SCNC: 13 MMOL/L
AST SERPL-CCNC: 23 U/L
BILIRUB SERPL-MCNC: 0.4 MG/DL
BUN SERPL-MCNC: 14 MG/DL
CALCIUM SERPL-MCNC: 9.8 MG/DL
CHLORIDE SERPL-SCNC: 100 MMOL/L
CO2 SERPL-SCNC: 29 MMOL/L
CREAT SERPL-MCNC: 0.91 MG/DL
GLUCOSE SERPL-MCNC: 94 MG/DL
HAPTOGLOB SERPL-MCNC: 114 MG/DL
LDH SERPL-CCNC: 174 U/L
POTASSIUM SERPL-SCNC: 4.1 MMOL/L
PROT SERPL-MCNC: 7.9 G/DL
SODIUM SERPL-SCNC: 142 MMOL/L

## 2018-12-19 ENCOUNTER — OUTPATIENT (OUTPATIENT)
Dept: OUTPATIENT SERVICES | Facility: HOSPITAL | Age: 23
LOS: 1 days | Discharge: HOME | End: 2018-12-19

## 2018-12-19 DIAGNOSIS — D59.1 OTHER AUTOIMMUNE HEMOLYTIC ANEMIAS: ICD-10-CM

## 2018-12-20 LAB
INR PPP: 1.01 RATIO
PT BLD: 11.4 SEC

## 2019-01-04 ENCOUNTER — RESULT REVIEW (OUTPATIENT)
Age: 24
End: 2019-01-04

## 2019-01-04 ENCOUNTER — APPOINTMENT (OUTPATIENT)
Dept: HEMATOLOGY ONCOLOGY | Facility: CLINIC | Age: 24
End: 2019-01-04
Payer: COMMERCIAL

## 2019-01-04 VITALS
WEIGHT: 240.3 LBS | RESPIRATION RATE: 16 BRPM | HEART RATE: 100 BPM | SYSTOLIC BLOOD PRESSURE: 130 MMHG | TEMPERATURE: 98.7 F | OXYGEN SATURATION: 97 % | DIASTOLIC BLOOD PRESSURE: 70 MMHG

## 2019-01-04 DIAGNOSIS — D59.1 OTHER AUTOIMMUNE HEMOLYTIC ANEMIAS: ICD-10-CM

## 2019-01-04 LAB
BASOPHILS # BLD AUTO: 0 K/UL — SIGNIFICANT CHANGE UP (ref 0–0.2)
BASOPHILS NFR BLD AUTO: 0.3 % — SIGNIFICANT CHANGE UP (ref 0–2)
EOSINOPHIL # BLD AUTO: 0.2 K/UL — SIGNIFICANT CHANGE UP (ref 0–0.5)
EOSINOPHIL NFR BLD AUTO: 3.2 % — SIGNIFICANT CHANGE UP (ref 0–6)
HCT VFR BLD CALC: 46.8 % — SIGNIFICANT CHANGE UP (ref 39–50)
HGB BLD-MCNC: 16.7 G/DL — SIGNIFICANT CHANGE UP (ref 13–17)
LYMPHOCYTES # BLD AUTO: 1.9 K/UL — SIGNIFICANT CHANGE UP (ref 1–3.3)
LYMPHOCYTES # BLD AUTO: 25 % — SIGNIFICANT CHANGE UP (ref 13–44)
MCHC RBC-ENTMCNC: 30.2 PG — SIGNIFICANT CHANGE UP (ref 27–34)
MCHC RBC-ENTMCNC: 35.6 G/DL — SIGNIFICANT CHANGE UP (ref 32–36)
MCV RBC AUTO: 85 FL — SIGNIFICANT CHANGE UP (ref 80–100)
MONOCYTES # BLD AUTO: 0.5 K/UL — SIGNIFICANT CHANGE UP (ref 0–0.9)
MONOCYTES NFR BLD AUTO: 5.9 % — SIGNIFICANT CHANGE UP (ref 2–14)
NEUTROPHILS # BLD AUTO: 5 K/UL — SIGNIFICANT CHANGE UP (ref 1.8–7.4)
NEUTROPHILS NFR BLD AUTO: 65.6 % — SIGNIFICANT CHANGE UP (ref 43–77)
PLATELET # BLD AUTO: 186 K/UL — SIGNIFICANT CHANGE UP (ref 150–400)
RBC # BLD: 5.51 M/UL — SIGNIFICANT CHANGE UP (ref 4.2–5.8)
RBC # FLD: 12.3 % — SIGNIFICANT CHANGE UP (ref 10.3–14.5)
WBC # BLD: 7.7 K/UL — SIGNIFICANT CHANGE UP (ref 3.8–10.5)
WBC # FLD AUTO: 7.7 K/UL — SIGNIFICANT CHANGE UP (ref 3.8–10.5)

## 2019-01-04 PROCEDURE — 99213 OFFICE O/P EST LOW 20 MIN: CPT

## 2019-01-04 NOTE — ASSESSMENT
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 No plans for surgical intervention at this time. \par \par -return visit 3-4 months for bloodwork\par -patient reports he will call with any symptoms of his anemia and come in for check sooner if necessary\par \par

## 2019-01-04 NOTE — HISTORY OF PRESENT ILLNESS
[de-identified] : 23 year old male who is here to see me for follow up of his AIHA\par He was first seen when he developed anemia on 12/2018.  He presented with jaundice ,palpitations and shortness of breath, found to be anemic, Hb 6.6. He was admitted to [facility] from 12/22/18-12/24/18, where he was diagnosed with a warm autoimmune hemolytic anemia (direct marybeth positive, IgG, C3 negative, pan-agglutinin) and was started on 100 mg prednisone. JACK, DsDNA, C3/C4, HIV, EBV negative.\par \par Prior to these symptoms, he was feeling very well. No recent medications, illnesses, hospitalizations. He has a history of mononucleosis in 2011 but otherwise no other past medical history. \par \par CT c/a/p was done on 1/19/18 to evaluate for underlying lymphoproliferative disorder and showed an indeterminate hypervascular lesion along the left anterior abdominal wall lateral to the rectus sheath measuring 2.4 x 1.2 cm. This was biopsied by Dr. 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  Right after, on 4/6/18, symptoms (palpitations and shortness of breath) returned, Hb dropped from 13 to 9.8,  went back on prednisone 40mg and also was given rituximab, completed 5/4/18, weekly x 4 weeks. Prednisone was tapered further, completed June 28th. \par \par \par  [de-identified] : feeling well\par no signs of fatigue\par eating well\par no weight loss f/c/s\par finishing his last few credits for school\par looking for job\par

## 2019-01-11 NOTE — ED ADULT NURSE NOTE - CADM POA URETHRAL CATHETER
Wound Care: [place] Island Pedicle Flap-Requiring Vessel Identification Text: The defect edges were debeveled with a #15 scalpel blade.  Given the location of the defect, shape of the defect and the proximity to free margins an island pedicle advancement flap was deemed most appropriate.  Using a sterile surgical marker, an appropriate advancement flap was drawn, based on the axial vessel mentioned above, incorporating the defect, outlining the appropriate donor tissue and placing the expected incisions within the relaxed skin tension lines where possible.    The area thus outlined was incised deep to adipose tissue with a #15 scalpel blade.  The skin margins were undermined to an appropriate distance in all directions around the primary defect and laterally outward around the island pedicle utilizing iris scissors.  There was minimal undermining beneath the pedicle flap. Pre-Excision Curettage Text (Leave Blank If You Do Not Want): Prior to drawing the surgical margin the visible lesion was removed with electrodesiccation and curettage to clearly define the lesion size. Curvilinear Excision Additional Text (Leave Blank If You Do Not Want): The margin was drawn around the clinically apparent lesion.  A curvilinear shape was then drawn on the skin incorporating the lesion and margins.  Incisions were then made along these lines to the appropriate tissue plane and the lesion was extirpated. Scalpel Size: 15 blade Complex Repair And Tissue Cultured Epidermal Autograft Text: The defect edges were debeveled with a #15 scalpel blade.  The primary defect was closed partially with a complex linear closure.  Given the location of the defect, shape of the defect and the proximity to free margins an tissue cultured epidermal autograft was deemed most appropriate to repair the remaining defect.  The graft was trimmed to fit the size of the remaining defect.  The graft was then placed in the primary defect, oriented appropriately, and sutured into place. X Size Of Lesion In Cm (Optional): 0 Composite Graft Text: The defect edges were debeveled with a #15 scalpel blade.  Given the location of the defect, shape of the defect, the proximity to free margins and the fact the defect was full thickness a composite graft was deemed most appropriate.  The defect was outline and then transferred to the donor site.  A full thickness graft was then excised from the donor site. The graft was then placed in the primary defect, oriented appropriately and then sutured into place.  The secondary defect was then repaired using a primary closure. Estimated Blood Loss (Cc): minimal Complex Repair And Modified Advancement Flap Text: The defect edges were debeveled with a #15 scalpel blade.  The primary defect was closed partially with a complex linear closure.  Given the location of the remaining defect, shape of the defect and the proximity to free margins a modified advancement flap was deemed most appropriate for complete closure of the defect.  Using a sterile surgical marker, an appropriate advancement flap was drawn incorporating the defect and placing the expected incisions within the relaxed skin tension lines where possible.    The area thus outlined was incised deep to adipose tissue with a #15 scalpel blade.  The skin margins were undermined to an appropriate distance in all directions utilizing iris scissors. V-Y Plasty Text: The defect edges were debeveled with a #15 scalpel blade.  Given the location of the defect, shape of the defect and the proximity to free margins an V-Y advancement flap was deemed most appropriate.  Using a sterile surgical marker, an appropriate advancement flap was drawn incorporating the defect and placing the expected incisions within the relaxed skin tension lines where possible.    The area thus outlined was incised deep to adipose tissue with a #15 scalpel blade.  The skin margins were undermined to an appropriate distance in all directions utilizing iris scissors. A-T Advancement Flap Text: The defect edges were debeveled with a #15 scalpel blade.  Given the location of the defect, shape of the defect and the proximity to free margins an A-T advancement flap was deemed most appropriate.  Using a sterile surgical marker, an appropriate advancement flap was drawn incorporating the defect and placing the expected incisions within the relaxed skin tension lines where possible.    The area thus outlined was incised deep to adipose tissue with a #15 scalpel blade.  The skin margins were undermined to an appropriate distance in all directions utilizing iris scissors. Show Anatomic Location From Referring Provider Variable: Yes Deep Sutures: 3-0 Monocryl Lazy S Complex Repair Preamble Text (Leave Blank If You Do Not Want): Extensive wide undermining was performed. Positioning (Leave Blank If You Do Not Want): The patient was placed in a comfortable position exposing the surgical site. Excision Method: Elliptical No W Plasty Text: The lesion was extirpated to the level of the fat with a #15 scalpel blade.  Given the location of the defect, shape of the defect and the proximity to free margins a W-plasty was deemed most appropriate for repair.  Using a sterile surgical marker, the appropriate transposition arms of the W-plasty were drawn incorporating the defect and placing the expected incisions within the relaxed skin tension lines where possible.    The area thus outlined was incised deep to adipose tissue with a #15 scalpel blade.  The skin margins were undermined to an appropriate distance in all directions utilizing iris scissors.  The opposing transposition arms were then transposed into place in opposite direction and anchored with interrupted buried subcutaneous sutures. Perilesional Excision Additional Text (Leave Blank If You Do Not Want): The margin was drawn around the clinically apparent lesion. Incisions were then made along these lines to the appropriate tissue plane and the lesion was extirpated. Anesthesia Volume In Cc: 9 Repair Type: Intermediate Complex Repair And M Plasty Text: The defect edges were debeveled with a #15 scalpel blade.  The primary defect was closed partially with a complex linear closure.  Given the location of the remaining defect, shape of the defect and the proximity to free margins an M plasty was deemed most appropriate for complete closure of the defect.  Using a sterile surgical marker, an appropriate advancement flap was drawn incorporating the defect and placing the expected incisions within the relaxed skin tension lines where possible.    The area thus outlined was incised deep to adipose tissue with a #15 scalpel blade.  The skin margins were undermined to an appropriate distance in all directions utilizing iris scissors. Patient Will Remove Sutures At Home?: No Spiral Flap Text: The defect edges were debeveled with a #15 scalpel blade.  Given the location of the defect, shape of the defect and the proximity to free margins a spiral flap was deemed most appropriate.  Using a sterile surgical marker, an appropriate rotation flap was drawn incorporating the defect and placing the expected incisions within the relaxed skin tension lines where possible. The area thus outlined was incised deep to adipose tissue with a #15 scalpel blade.  The skin margins were undermined to an appropriate distance in all directions utilizing iris scissors. Anesthesia Type: 1% lidocaine without epinephrine Hatchet Flap Text: The defect edges were debeveled with a #15 scalpel blade.  Given the location of the defect, shape of the defect and the proximity to free margins a hatchet flap was deemed most appropriate.  Using a sterile surgical marker, an appropriate hatchet flap was drawn incorporating the defect and placing the expected incisions within the relaxed skin tension lines where possible.    The area thus outlined was incised deep to adipose tissue with a #15 scalpel blade.  The skin margins were undermined to an appropriate distance in all directions utilizing iris scissors. Double M-Plasty Intermediate Repair Preamble Text (Leave Blank If You Do Not Want): Undermining was performed with blunt dissection. Muscle Hinge Flap Text: The defect edges were debeveled with a #15 scalpel blade.  Given the size, depth and location of the defect and the proximity to free margins a muscle hinge flap was deemed most appropriate.  Using a sterile surgical marker, an appropriate hinge flap was drawn incorporating the defect. The area thus outlined was incised with a #15 scalpel blade.  The skin margins were undermined to an appropriate distance in all directions utilizing iris scissors. Epidermal Sutures: 4-0 Prolene Saucerization Excision Additional Text (Leave Blank If You Do Not Want): The margin was drawn around the clinically apparent lesion.  Incisions were then made along these lines, in a tangential fashion, to the appropriate tissue plane and the lesion was extirpated. Melolabial Transposition Flap Text: The defect edges were debeveled with a #15 scalpel blade.  Given the location of the defect and the proximity to free margins a melolabial flap was deemed most appropriate.  Using a sterile surgical marker, an appropriate melolabial transposition flap was drawn incorporating the defect.    The area thus outlined was incised deep to adipose tissue with a #15 scalpel blade.  The skin margins were undermined to an appropriate distance in all directions utilizing iris scissors. Burow's Advancement Flap Text: The defect edges were debeveled with a #15 scalpel blade.  Given the location of the defect and the proximity to free margins a Burow's advancement flap was deemed most appropriate.  Using a sterile surgical marker, the appropriate advancement flap was drawn incorporating the defect and placing the expected incisions within the relaxed skin tension lines where possible.    The area thus outlined was incised deep to adipose tissue with a #15 scalpel blade.  The skin margins were undermined to an appropriate distance in all directions utilizing iris scissors. Rhombic Flap Text: The defect edges were debeveled with a #15 scalpel blade.  Given the location of the defect and the proximity to free margins a rhombic flap was deemed most appropriate.  Using a sterile surgical marker, an appropriate rhombic flap was drawn incorporating the defect.    The area thus outlined was incised deep to adipose tissue with a #15 scalpel blade.  The skin margins were undermined to an appropriate distance in all directions utilizing iris scissors. Billing Type: Third-Party Bill Cartilage Graft Text: The defect edges were debeveled with a #15 scalpel blade.  Given the location of the defect, shape of the defect, the fact the defect involved a full thickness cartilage defect a cartilage graft was deemed most appropriate.  An appropriate donor site was identified, cleansed, and anesthetized. The cartilage graft was then harvested and transferred to the recipient site, oriented appropriately and then sutured into place.  The secondary defect was then repaired using a primary closure. Ftsg Text: The defect edges were debeveled with a #15 scalpel blade.  Given the location of the defect, shape of the defect and the proximity to free margins a full thickness skin graft was deemed most appropriate.  Using a sterile surgical marker, the primary defect shape was transferred to the donor site. The area thus outlined was incised deep to adipose tissue with a #15 scalpel blade.  The harvested graft was then trimmed of adipose tissue until only dermis and epidermis was left.  The skin margins of the secondary defect were undermined to an appropriate distance in all directions utilizing iris scissors.  The secondary defect was closed with interrupted buried subcutaneous sutures.  The skin edges were then re-apposed with running  sutures.  The skin graft was then placed in the primary defect and oriented appropriately. Anesthesia Type: 1% lidocaine with epinephrine and a 1:10 solution of 8.4% sodium bicarbonate Cheek-To-Nose Interpolation Flap Text: A decision was made to reconstruct the defect utilizing an interpolation axial flap and a staged reconstruction.  A telfa template was made of the defect.  This telfa template was then used to outline the Cheek-To-Nose Interpolation flap.  The donor area for the pedicle flap was then injected with anesthesia.  The flap was excised through the skin and subcutaneous tissue down to the layer of the underlying musculature.  The interpolation flap was carefully excised within this deep plane to maintain its blood supply.  The edges of the donor site were undermined.   The donor site was closed in a primary fashion.  The pedicle was then rotated into position and sutured.  Once the tube was sutured into place, adequate blood supply was confirmed with blanching and refill.  The pedicle was then wrapped with xeroform gauze and dressed appropriately with a telfa and gauze bandage to ensure continued blood supply and protect the attached pedicle. Bilobed Flap Text: The defect edges were debeveled with a #15 scalpel blade.  Given the location of the defect and the proximity to free margins a bilobe flap was deemed most appropriate.  Using a sterile surgical marker, an appropriate bilobe flap drawn around the defect.    The area thus outlined was incised deep to adipose tissue with a #15 scalpel blade.  The skin margins were undermined to an appropriate distance in all directions utilizing iris scissors. Complex Repair And Xenograft Text: The defect edges were debeveled with a #15 scalpel blade.  The primary defect was closed partially with a complex linear closure.  Given the location of the defect, shape of the defect and the proximity to free margins a xenograft was deemed most appropriate to repair the remaining defect.  The graft was trimmed to fit the size of the remaining defect.  The graft was then placed in the primary defect, oriented appropriately, and sutured into place. Complex Repair And Double M Plasty Text: The defect edges were debeveled with a #15 scalpel blade.  The primary defect was closed partially with a complex linear closure.  Given the location of the remaining defect, shape of the defect and the proximity to free margins a double M plasty was deemed most appropriate for complete closure of the defect.  Using a sterile surgical marker, an appropriate advancement flap was drawn incorporating the defect and placing the expected incisions within the relaxed skin tension lines where possible.    The area thus outlined was incised deep to adipose tissue with a #15 scalpel blade.  The skin margins were undermined to an appropriate distance in all directions utilizing iris scissors. No Repair - Repaired With Adjacent Surgical Defect Text (Leave Blank If You Do Not Want): After the excision the defect was repaired concurrently with another surgical defect which was in close approximation. Dressing: pressure dressing with telfa Information: Selecting Yes will display possible errors in your note based on the variables you have selected. This validation is only offered as a suggestion for you. PLEASE NOTE THAT THE VALIDATION TEXT WILL BE REMOVED WHEN YOU FINALIZE YOUR NOTE. IF YOU WANT TO FAX A PRELIMINARY NOTE YOU WILL NEED TO TOGGLE THIS TO 'NO' IF YOU DO NOT WANT IT IN YOUR FAXED NOTE. Mercedes Flap Text: The defect edges were debeveled with a #15 scalpel blade.  Given the location of the defect, shape of the defect and the proximity to free margins a Mercedes flap was deemed most appropriate.  Using a sterile surgical marker, an appropriate advancement flap was drawn incorporating the defect and placing the expected incisions within the relaxed skin tension lines where possible. The area thus outlined was incised deep to adipose tissue with a #15 scalpel blade.  The skin margins were undermined to an appropriate distance in all directions utilizing iris scissors. O-T Advancement Flap Text: The defect edges were debeveled with a #15 scalpel blade.  Given the location of the defect, shape of the defect and the proximity to free margins an O-T advancement flap was deemed most appropriate.  Using a sterile surgical marker, an appropriate advancement flap was drawn incorporating the defect and placing the expected incisions within the relaxed skin tension lines where possible.    The area thus outlined was incised deep to adipose tissue with a #15 scalpel blade.  The skin margins were undermined to an appropriate distance in all directions utilizing iris scissors. Elliptical Excision Additional Text (Leave Blank If You Do Not Want): The margin was drawn around the clinically apparent lesion.  An elliptical shape was then drawn on the skin incorporating the lesion and margins.  Incisions were then made along these lines to the appropriate tissue plane and the lesion was extirpated. Mucosal Advancement Flap Text: Given the location of the defect, shape of the defect and the proximity to free margins a mucosal advancement flap was deemed most appropriate. Incisions were made with a 15 blade scalpel in the appropriate fashion along the cutaneous vermilion border and the mucosal lip. The remaining actinically damaged mucosal tissue was excised.  The mucosal advancement flap was then elevated to the gingival sulcus with care taken to preserve the neurovascular structures and advanced into the primary defect. Care was taken to ensure that precise realignment of the vermilion border was achieved. Consent was obtained from the patient. The risks and benefits to therapy were discussed in detail. Specifically, the risks of infection, scarring, bleeding, prolonged wound healing, incomplete removal, allergy to anesthesia, nerve injury and recurrence were addressed. Prior to the procedure, the treatment site was clearly identified and confirmed by the patient. All components of Universal Protocol/PAUSE Rule completed. Complex Repair And Split-Thickness Skin Graft Text: The defect edges were debeveled with a #15 scalpel blade.  The primary defect was closed partially with a complex linear closure.  Given the location of the defect, shape of the defect and the proximity to free margins a split thickness skin graft was deemed most appropriate to repair the remaining defect.  The graft was trimmed to fit the size of the remaining defect.  The graft was then placed in the primary defect, oriented appropriately, and sutured into place. Complex Repair And Skin Substitute Graft Text: The defect edges were debeveled with a #15 scalpel blade.  The primary defect was closed partially with a complex linear closure.  Given the location of the remaining defect, shape of the defect and the proximity to free margins a skin substitute graft was deemed most appropriate to repair the remaining defect.  The graft was trimmed to fit the size of the remaining defect.  The graft was then placed in the primary defect, oriented appropriately, and sutured into place. Epidermal Autograft Text: The defect edges were debeveled with a #15 scalpel blade.  Given the location of the defect, shape of the defect and the proximity to free margins an epidermal autograft was deemed most appropriate.  Using a sterile surgical marker, the primary defect shape was transferred to the donor site. The epidermal graft was then harvested.  The skin graft was then placed in the primary defect and oriented appropriately. Star Wedge Flap Text: The defect edges were debeveled with a #15 scalpel blade.  Given the location of the defect, shape of the defect and the proximity to free margins a star wedge flap was deemed most appropriate.  Using a sterile surgical marker, an appropriate rotation flap was drawn incorporating the defect and placing the expected incisions within the relaxed skin tension lines where possible. The area thus outlined was incised deep to adipose tissue with a #15 scalpel blade.  The skin margins were undermined to an appropriate distance in all directions utilizing iris scissors. Post-Care Instructions: I reviewed with the patient in detail post-care instructions. Patient is not to engage in any heavy lifting, exercise, or swimming for the next 14 days. Should the patient develop any fevers, chills, bleeding, severe pain patient will contact the office immediately. Epidermal Closure Graft Donor Site (Optional): simple interrupted Complex Repair And Dorsal Nasal Flap Text: The defect edges were debeveled with a #15 scalpel blade.  The primary defect was closed partially with a complex linear closure.  Given the location of the remaining defect, shape of the defect and the proximity to free margins a dorsal nasal flap was deemed most appropriate for complete closure of the defect.  Using a sterile surgical marker, an appropriate flap was drawn incorporating the defect and placing the expected incisions within the relaxed skin tension lines where possible.    The area thus outlined was incised deep to adipose tissue with a #15 scalpel blade.  The skin margins were undermined to an appropriate distance in all directions utilizing iris scissors. Melolabial Interpolation Flap Text: A decision was made to reconstruct the defect utilizing an interpolation axial flap and a staged reconstruction.  A telfa template was made of the defect.  This telfa template was then used to outline the melolabial interpolation flap.  The donor area for the pedicle flap was then injected with anesthesia.  The flap was excised through the skin and subcutaneous tissue down to the layer of the underlying musculature.  The pedicle flap was carefully excised within this deep plane to maintain its blood supply.  The edges of the donor site were undermined.   The donor site was closed in a primary fashion.  The pedicle was then rotated into position and sutured.  Once the tube was sutured into place, adequate blood supply was confirmed with blanching and refill.  The pedicle was then wrapped with xeroform gauze and dressed appropriately with a telfa and gauze bandage to ensure continued blood supply and protect the attached pedicle. Bi-Rhombic Flap Text: The defect edges were debeveled with a #15 scalpel blade.  Given the location of the defect and the proximity to free margins a bi-rhombic flap was deemed most appropriate.  Using a sterile surgical marker, an appropriate rhombic flap was drawn incorporating the defect. The area thus outlined was incised deep to adipose tissue with a #15 scalpel blade.  The skin margins were undermined to an appropriate distance in all directions utilizing iris scissors. Epidermal Closure: running Crescentic Advancement Flap Text: The defect edges were debeveled with a #15 scalpel blade.  Given the location of the defect and the proximity to free margins a crescentic advancement flap was deemed most appropriate.  Using a sterile surgical marker, the appropriate advancement flap was drawn incorporating the defect and placing the expected incisions within the relaxed skin tension lines where possible.    The area thus outlined was incised deep to adipose tissue with a #15 scalpel blade.  The skin margins were undermined to an appropriate distance in all directions utilizing iris scissors. Bilateral Helical Rim Advancement Flap Text: The defect edges were debeveled with a #15 blade scalpel.  Given the location of the defect and the proximity to free margins (helical rim) a bilateral helical rim advancement flap was deemed most appropriate.  Using a sterile surgical marker, the appropriate advancement flaps were drawn incorporating the defect and placing the expected incisions between the helical rim and antihelix where possible.  The area thus outlined was incised through and through with a #15 scalpel blade.  With a skin hook and iris scissors, the flaps were gently and sharply undermined and freed up. Split-Thickness Skin Graft Text: The defect edges were debeveled with a #15 scalpel blade.  Given the location of the defect, shape of the defect and the proximity to free margins a split thickness skin graft was deemed most appropriate.  Using a sterile surgical marker, the primary defect shape was transferred to the donor site. The split thickness graft was then harvested.  The skin graft was then placed in the primary defect and oriented appropriately. Home Suture Removal Text: Patient was provided a home suture removal kit and will remove their sutures at home.  If they have any questions or difficulties they will call the office. Z Plasty Text: The lesion was extirpated to the level of the fat with a #15 scalpel blade.  Given the location of the defect, shape of the defect and the proximity to free margins a Z-plasty was deemed most appropriate for repair.  Using a sterile surgical marker, the appropriate transposition arms of the Z-plasty were drawn incorporating the defect and placing the expected incisions within the relaxed skin tension lines where possible.    The area thus outlined was incised deep to adipose tissue with a #15 scalpel blade.  The skin margins were undermined to an appropriate distance in all directions utilizing iris scissors.  The opposing transposition arms were then transposed into place in opposite direction and anchored with interrupted buried subcutaneous sutures. Complex Repair And Melolabial Flap Text: The defect edges were debeveled with a #15 scalpel blade.  The primary defect was closed partially with a complex linear closure.  Given the location of the remaining defect, shape of the defect and the proximity to free margins a melolabial flap was deemed most appropriate for complete closure of the defect.  Using a sterile surgical marker, an appropriate advancement flap was drawn incorporating the defect and placing the expected incisions within the relaxed skin tension lines where possible.    The area thus outlined was incised deep to adipose tissue with a #15 scalpel blade.  The skin margins were undermined to an appropriate distance in all directions utilizing iris scissors. Graft Donor Site Bandage (Optional-Leave Blank If You Don't Want In Note): Left Supra clavicular graft donor site. Total grafted area 2x2cm = 4 cm area. Linear intermediate closure of donor site. Helical Rim Advancement Flap Text: The defect edges were debeveled with a #15 blade scalpel.  Given the location of the defect and the proximity to free margins (helical rim) a double helical rim advancement flap was deemed most appropriate.  Using a sterile surgical marker, the appropriate advancement flaps were drawn incorporating the defect and placing the expected incisions between the helical rim and antihelix where possible.  The area thus outlined was incised through and through with a #15 scalpel blade.  With a skin hook and iris scissors, the flaps were gently and sharply undermined and freed up. Complex Repair And Epidermal Autograft Text: The defect edges were debeveled with a #15 scalpel blade.  The primary defect was closed partially with a complex linear closure.  Given the location of the defect, shape of the defect and the proximity to free margins an epidermal autograft was deemed most appropriate to repair the remaining defect.  The graft was trimmed to fit the size of the remaining defect.  The graft was then placed in the primary defect, oriented appropriately, and sutured into place. Modified Advancement Flap Text: The defect edges were debeveled with a #15 scalpel blade.  Given the location of the defect, shape of the defect and the proximity to free margins a modified advancement flap was deemed most appropriate.  Using a sterile surgical marker, an appropriate advancement flap was drawn incorporating the defect and placing the expected incisions within the relaxed skin tension lines where possible.    The area thus outlined was incised deep to adipose tissue with a #15 scalpel blade.  The skin margins were undermined to an appropriate distance in all directions utilizing iris scissors. Complex Repair And O-L Flap Text: The defect edges were debeveled with a #15 scalpel blade.  The primary defect was closed partially with a complex linear closure.  Given the location of the remaining defect, shape of the defect and the proximity to free margins an O-L flap was deemed most appropriate for complete closure of the defect.  Using a sterile surgical marker, an appropriate flap was drawn incorporating the defect and placing the expected incisions within the relaxed skin tension lines where possible.    The area thus outlined was incised deep to adipose tissue with a #15 scalpel blade.  The skin margins were undermined to an appropriate distance in all directions utilizing iris scissors. Paramedian Forehead Flap Text: A decision was made to reconstruct the defect utilizing an interpolation axial flap and a staged reconstruction.  A telfa template was made of the defect.  This telfa template was then used to outline the paramedian forehead pedicle flap.  The donor area for the pedicle flap was then injected with anesthesia.  The flap was excised through the skin and subcutaneous tissue down to the layer of the underlying musculature.  The pedicle flap was carefully excised within this deep plane to maintain its blood supply.  The edges of the donor site were undermined.   The donor site was closed in a primary fashion.  The pedicle was then rotated into position and sutured.  Once the tube was sutured into place, adequate blood supply was confirmed with blanching and refill.  The pedicle was then wrapped with xeroform gauze and dressed appropriately with a telfa and gauze bandage to ensure continued blood supply and protect the attached pedicle. Complex Repair And W Plasty Text: The defect edges were debeveled with a #15 scalpel blade.  The primary defect was closed partially with a complex linear closure.  Given the location of the remaining defect, shape of the defect and the proximity to free margins a W plasty was deemed most appropriate for complete closure of the defect.  Using a sterile surgical marker, an appropriate advancement flap was drawn incorporating the defect and placing the expected incisions within the relaxed skin tension lines where possible.    The area thus outlined was incised deep to adipose tissue with a #15 scalpel blade.  The skin margins were undermined to an appropriate distance in all directions utilizing iris scissors. Dorsal Nasal Flap Text: The defect edges were debeveled with a #15 scalpel blade.  Given the location of the defect and the proximity to free margins a dorsal nasal flap was deemed most appropriate.  Using a sterile surgical marker, an appropriate dorsal nasal flap was drawn around the defect.    The area thus outlined was incised deep to adipose tissue with a #15 scalpel blade.  The skin margins were undermined to an appropriate distance in all directions utilizing iris scissors. Complex Repair And Ftsg Text: The defect edges were debeveled with a #15 scalpel blade.  The primary defect was closed partially with a complex linear closure.  Given the location of the defect, shape of the defect and the proximity to free margins a full thickness skin graft was deemed most appropriate to repair the remaining defect.  The graft was trimmed to fit the size of the remaining defect.  The graft was then placed in the primary defect, oriented appropriately, and sutured into place. O-Z Plasty Text: The defect edges were debeveled with a #15 scalpel blade.  Given the location of the defect, shape of the defect and the proximity to free margins an O-Z plasty (double transposition flap) was deemed most appropriate.  Using a sterile surgical marker, the appropriate transposition flaps were drawn incorporating the defect and placing the expected incisions within the relaxed skin tension lines where possible.    The area thus outlined was incised deep to adipose tissue with a #15 scalpel blade.  The skin margins were undermined to an appropriate distance in all directions utilizing iris scissors.  Hemostasis was achieved with electrocautery.  The flaps were then transposed into place, one clockwise and the other counterclockwise, and anchored with interrupted buried subcutaneous sutures. Trilobed Flap Text: The defect edges were debeveled with a #15 scalpel blade.  Given the location of the defect and the proximity to free margins a trilobed flap was deemed most appropriate.  Using a sterile surgical marker, an appropriate trilobed flap drawn around the defect.    The area thus outlined was incised deep to adipose tissue with a #15 scalpel blade.  The skin margins were undermined to an appropriate distance in all directions utilizing iris scissors. Xenograft Text: The defect edges were debeveled with a #15 scalpel blade.  Given the location of the defect, shape of the defect and the proximity to free margins a xenograft was deemed most appropriate.  The graft was then trimmed to fit the size of the defect.  The graft was then placed in the primary defect and oriented appropriately. Size Of Lesion In Cm: 0.7 Complex Repair And Z Plasty Text: The defect edges were debeveled with a #15 scalpel blade.  The primary defect was closed partially with a complex linear closure.  Given the location of the remaining defect, shape of the defect and the proximity to free margins a Z plasty was deemed most appropriate for complete closure of the defect.  Using a sterile surgical marker, an appropriate advancement flap was drawn incorporating the defect and placing the expected incisions within the relaxed skin tension lines where possible.    The area thus outlined was incised deep to adipose tissue with a #15 scalpel blade.  The skin margins were undermined to an appropriate distance in all directions utilizing iris scissors. Complex Repair And Single Advancement Flap Text: The defect edges were debeveled with a #15 scalpel blade.  The primary defect was closed partially with a complex linear closure.  Given the location of the remaining defect, shape of the defect and the proximity to free margins a single advancement flap was deemed most appropriate for complete closure of the defect.  Using a sterile surgical marker, an appropriate advancement flap was drawn incorporating the defect and placing the expected incisions within the relaxed skin tension lines where possible.    The area thus outlined was incised deep to adipose tissue with a #15 scalpel blade.  The skin margins were undermined to an appropriate distance in all directions utilizing iris scissors. Transposition Flap Text: The defect edges were debeveled with a #15 scalpel blade.  Given the location of the defect and the proximity to free margins a transposition flap was deemed most appropriate.  Using a sterile surgical marker, an appropriate transposition flap was drawn incorporating the defect.    The area thus outlined was incised deep to adipose tissue with a #15 scalpel blade.  The skin margins were undermined to an appropriate distance in all directions utilizing iris scissors. Complex Repair And V-Y Plasty Text: The defect edges were debeveled with a #15 scalpel blade.  The primary defect was closed partially with a complex linear closure.  Given the location of the remaining defect, shape of the defect and the proximity to free margins a V-Y plasty was deemed most appropriate for complete closure of the defect.  Using a sterile surgical marker, an appropriate advancement flap was drawn incorporating the defect and placing the expected incisions within the relaxed skin tension lines where possible.    The area thus outlined was incised deep to adipose tissue with a #15 scalpel blade.  The skin margins were undermined to an appropriate distance in all directions utilizing iris scissors. Bilobed Transposition Flap Text: The defect edges were debeveled with a #15 scalpel blade.  Given the location of the defect and the proximity to free margins a bilobed transposition flap was deemed most appropriate.  Using a sterile surgical marker, an appropriate bilobe flap drawn around the defect.    The area thus outlined was incised deep to adipose tissue with a #15 scalpel blade.  The skin margins were undermined to an appropriate distance in all directions utilizing iris scissors. Excisional Biopsy Additional Text (Leave Blank If You Do Not Want): The margin was drawn around the clinically apparent lesion. An elliptical shape was then drawn on the skin incorporating the lesion and margins.  Incisions were then made along these lines to the appropriate tissue plane and the lesion was extirpated. Island Pedicle Flap Text: The defect edges were debeveled with a #15 scalpel blade.  Given the location of the defect, shape of the defect and the proximity to free margins an island pedicle advancement flap was deemed most appropriate.  Using a sterile surgical marker, an appropriate advancement flap was drawn incorporating the defect, outlining the appropriate donor tissue and placing the expected incisions within the relaxed skin tension lines where possible.    The area thus outlined was incised deep to adipose tissue with a #15 scalpel blade.  The skin margins were undermined to an appropriate distance in all directions around the primary defect and laterally outward around the island pedicle utilizing iris scissors.  There was minimal undermining beneath the pedicle flap. Path Notes (To The Dermatopathologist): Please check margins. Lip Wedge Excision Repair Text: Given the location of the defect and the proximity to free margins a full thickness wedge repair was deemed most appropriate.  Using a sterile surgical marker, the appropriate repair was drawn incorporating the defect and placing the expected incisions perpendicular to the vermilion border.  The vermilion border was also meticulously outlined to ensure appropriate reapproximation during the repair.  The area thus outlined was incised through and through with a #15 scalpel blade.  The muscularis and dermis were reaproximated with deep sutures following hemostasis. Care was taken to realign the vermilion border before proceeding with the superficial closure.  Once the vermilion was realigned the superfical and mucosal closure was finished. Interpolation Flap Text: A decision was made to reconstruct the defect utilizing an interpolation axial flap and a staged reconstruction.  A telfa template was made of the defect.  This telfa template was then used to outline the interpolation flap.  The donor area for the pedicle flap was then injected with anesthesia.  The flap was excised through the skin and subcutaneous tissue down to the layer of the underlying musculature.  The interpolation flap was carefully excised within this deep plane to maintain its blood supply.  The edges of the donor site were undermined.   The donor site was closed in a primary fashion.  The pedicle was then rotated into position and sutured.  Once the tube was sutured into place, adequate blood supply was confirmed with blanching and refill.  The pedicle was then wrapped with xeroform gauze and dressed appropriately with a telfa and gauze bandage to ensure continued blood supply and protect the attached pedicle. Size Of Margin In Cm: 0.4 Skin Substitute Text: The defect edges were debeveled with a #15 scalpel blade.  Given the location of the defect, shape of the defect and the proximity to free margins a skin substitute graft was deemed most appropriate.  The graft material was trimmed to fit the size of the defect. The graft was then placed in the primary defect and oriented appropriately. Complex Repair And Dermal Autograft Text: The defect edges were debeveled with a #15 scalpel blade.  The primary defect was closed partially with a complex linear closure.  Given the location of the defect, shape of the defect and the proximity to free margins an dermal autograft was deemed most appropriate to repair the remaining defect.  The graft was trimmed to fit the size of the remaining defect.  The graft was then placed in the primary defect, oriented appropriately, and sutured into place. Complex Repair And Double Advancement Flap Text: The defect edges were debeveled with a #15 scalpel blade.  The primary defect was closed partially with a complex linear closure.  Given the location of the remaining defect, shape of the defect and the proximity to free margins a double advancement flap was deemed most appropriate for complete closure of the defect.  Using a sterile surgical marker, an appropriate advancement flap was drawn incorporating the defect and placing the expected incisions within the relaxed skin tension lines where possible.    The area thus outlined was incised deep to adipose tissue with a #15 scalpel blade.  The skin margins were undermined to an appropriate distance in all directions utilizing iris scissors. Partial Purse String (Simple) Text: Given the location of the defect and the characteristics of the surrounding skin a simple purse string closure was deemed most appropriate.  Undermining was performed circumferentially around the surgical defect.  A purse string suture was then placed and tightened. Wound tension of the circular defect prevented complete closure of the wound. Slit Excision Additional Text (Leave Blank If You Do Not Want): A linear line was drawn on the skin overlying the lesion. An incision was made slowly until the lesion was visualized.  Once visualized, the lesion was removed with blunt dissection. Double Island Pedicle Flap Text: The defect edges were debeveled with a #15 scalpel blade.  Given the location of the defect, shape of the defect and the proximity to free margins a double island pedicle advancement flap was deemed most appropriate.  Using a sterile surgical marker, an appropriate advancement flap was drawn incorporating the defect, outlining the appropriate donor tissue and placing the expected incisions within the relaxed skin tension lines where possible.    The area thus outlined was incised deep to adipose tissue with a #15 scalpel blade.  The skin margins were undermined to an appropriate distance in all directions around the primary defect and laterally outward around the island pedicle utilizing iris scissors.  There was minimal undermining beneath the pedicle flap. Dermal Autograft Text: The defect edges were debeveled with a #15 scalpel blade.  Given the location of the defect, shape of the defect and the proximity to free margins a dermal autograft was deemed most appropriate.  Using a sterile surgical marker, the primary defect shape was transferred to the donor site. The area thus outlined was incised deep to adipose tissue with a #15 scalpel blade.  The harvested graft was then trimmed of adipose and epidermal tissue until only dermis was left.  The skin graft was then placed in the primary defect and oriented appropriately. Intermediate / Complex Repair - Final Wound Length In Cm: 4 Complex Repair And Bilobe Flap Text: The defect edges were debeveled with a #15 scalpel blade.  The primary defect was closed partially with a complex linear closure.  Given the location of the remaining defect, shape of the defect and the proximity to free margins a bilobe flap was deemed most appropriate for complete closure of the defect.  Using a sterile surgical marker, an appropriate advancement flap was drawn incorporating the defect and placing the expected incisions within the relaxed skin tension lines where possible.    The area thus outlined was incised deep to adipose tissue with a #15 scalpel blade.  The skin margins were undermined to an appropriate distance in all directions utilizing iris scissors. Fusiform Excision Additional Text (Leave Blank If You Do Not Want): The margin was drawn around the clinically apparent lesion.  A fusiform shape was then drawn on the skin incorporating the lesion and margins.  Incisions were then made along these lines to the appropriate tissue plane and the lesion was extirpated. V-Y Flap Text: The defect edges were debeveled with a #15 scalpel blade.  Given the location of the defect, shape of the defect and the proximity to free margins a V-Y flap was deemed most appropriate.  Using a sterile surgical marker, an appropriate advancement flap was drawn incorporating the defect and placing the expected incisions within the relaxed skin tension lines where possible.    The area thus outlined was incised deep to adipose tissue with a #15 scalpel blade.  The skin margins were undermined to an appropriate distance in all directions utilizing iris scissors. H Plasty Text: Given the location of the defect, shape of the defect and the proximity to free margins a H-plasty was deemed most appropriate for repair.  Using a sterile surgical marker, the appropriate advancement arms of the H-plasty were drawn incorporating the defect and placing the expected incisions within the relaxed skin tension lines where possible. The area thus outlined was incised deep to adipose tissue with a #15 scalpel blade. The skin margins were undermined to an appropriate distance in all directions utilizing iris scissors.  The opposing advancement arms were then advanced into place in opposite direction and anchored with interrupted buried subcutaneous sutures. Purse String (Simple) Text: Given the location of the defect and the characteristics of the surrounding skin a purse string simple closure was deemed most appropriate.  Undermining was performed circumferentially around the surgical defect.  A purse string suture was then placed and tightened. Complex Repair And O-T Advancement Flap Text: The defect edges were debeveled with a #15 scalpel blade.  The primary defect was closed partially with a complex linear closure.  Given the location of the remaining defect, shape of the defect and the proximity to free margins an O-T advancement flap was deemed most appropriate for complete closure of the defect.  Using a sterile surgical marker, an appropriate advancement flap was drawn incorporating the defect and placing the expected incisions within the relaxed skin tension lines where possible.    The area thus outlined was incised deep to adipose tissue with a #15 scalpel blade.  The skin margins were undermined to an appropriate distance in all directions utilizing iris scissors. Advancement-Rotation Flap Text: The defect edges were debeveled with a #15 scalpel blade.  Given the location of the defect, shape of the defect and the proximity to free margins an advancement-rotation flap was deemed most appropriate.  Using a sterile surgical marker, an appropriate flap was drawn incorporating the defect and placing the expected incisions within the relaxed skin tension lines where possible. The area thus outlined was incised deep to adipose tissue with a #15 scalpel blade.  The skin margins were undermined to an appropriate distance in all directions utilizing iris scissors. O-T Plasty Text: The defect edges were debeveled with a #15 scalpel blade.  Given the location of the defect, shape of the defect and the proximity to free margins an O-T plasty was deemed most appropriate.  Using a sterile surgical marker, an appropriate O-T plasty was drawn incorporating the defect and placing the expected incisions within the relaxed skin tension lines where possible.    The area thus outlined was incised deep to adipose tissue with a #15 scalpel blade.  The skin margins were undermined to an appropriate distance in all directions utilizing iris scissors. Island Pedicle Flap With Canthal Suspension Text: The defect edges were debeveled with a #15 scalpel blade.  Given the location of the defect, shape of the defect and the proximity to free margins an island pedicle advancement flap was deemed most appropriate.  Using a sterile surgical marker, an appropriate advancement flap was drawn incorporating the defect, outlining the appropriate donor tissue and placing the expected incisions within the relaxed skin tension lines where possible. The area thus outlined was incised deep to adipose tissue with a #15 scalpel blade.  The skin margins were undermined to an appropriate distance in all directions around the primary defect and laterally outward around the island pedicle utilizing iris scissors.  There was minimal undermining beneath the pedicle flap. A suspension suture was placed in the canthal tendon to prevent tension and prevent ectropion. Partial Purse String (Intermediate) Text: Given the location of the defect and the characteristics of the surrounding skin an intermediate purse string closure was deemed most appropriate.  Undermining was performed circumferentially around the surgical defect.  A purse string suture was then placed and tightened. Wound tension of the circular defect prevented complete closure of the wound. Complex Repair And Rotation Flap Text: The defect edges were debeveled with a #15 scalpel blade.  The primary defect was closed partially with a complex linear closure.  Given the location of the remaining defect, shape of the defect and the proximity to free margins a rotation flap was deemed most appropriate for complete closure of the defect.  Using a sterile surgical marker, an appropriate advancement flap was drawn incorporating the defect and placing the expected incisions within the relaxed skin tension lines where possible.    The area thus outlined was incised deep to adipose tissue with a #15 scalpel blade.  The skin margins were undermined to an appropriate distance in all directions utilizing iris scissors. S Plasty Text: Given the location and shape of the defect, and the orientation of relaxed skin tension lines, an S-plasty was deemed most appropriate for repair.  Using a sterile surgical marker, the appropriate outline of the S-plasty was drawn, incorporating the defect and placing the expected incisions within the relaxed skin tension lines where possible.  The area thus outlined was incised deep to adipose tissue with a #15 scalpel blade.  The skin margins were undermined to an appropriate distance in all directions utilizing iris scissors. The skin flaps were advanced over the defect.  The opposing margins were then approximated with interrupted buried subcutaneous sutures. Double O-Z Plasty Text: The defect edges were debeveled with a #15 scalpel blade.  Given the location of the defect, shape of the defect and the proximity to free margins a Double O-Z plasty (double transposition flap) was deemed most appropriate.  Using a sterile surgical marker, the appropriate transposition flaps were drawn incorporating the defect and placing the expected incisions within the relaxed skin tension lines where possible. The area thus outlined was incised deep to adipose tissue with a #15 scalpel blade.  The skin margins were undermined to an appropriate distance in all directions utilizing iris scissors.  Hemostasis was achieved with electrocautery.  The flaps were then transposed into place, one clockwise and the other counterclockwise, and anchored with interrupted buried subcutaneous sutures. Posterior Auricular Interpolation Flap Text: A decision was made to reconstruct the defect utilizing an interpolation axial flap and a staged reconstruction.  A telfa template was made of the defect.  This telfa template was then used to outline the posterior auricular interpolation flap.  The donor area for the pedicle flap was then injected with anesthesia.  The flap was excised through the skin and subcutaneous tissue down to the layer of the underlying musculature.  The pedicle flap was carefully excised within this deep plane to maintain its blood supply.  The edges of the donor site were undermined.   The donor site was closed in a primary fashion.  The pedicle was then rotated into position and sutured.  Once the tube was sutured into place, adequate blood supply was confirmed with blanching and refill.  The pedicle was then wrapped with xeroform gauze and dressed appropriately with a telfa and gauze bandage to ensure continued blood supply and protect the attached pedicle. Excision Depth: deep subcutaneous fat Advancement Flap (Single) Text: The defect edges were debeveled with a #15 scalpel blade.  Given the location of the defect and the proximity to free margins a single advancement flap was deemed most appropriate.  Using a sterile surgical marker, an appropriate advancement flap was drawn incorporating the defect and placing the expected incisions within the relaxed skin tension lines where possible.    The area thus outlined was incised deep to adipose tissue with a #15 scalpel blade.  The skin margins were undermined to an appropriate distance in all directions utilizing iris scissors. Body Location Override (Optional - Billing Will Still Be Based On Selected Body Map Location If Applicable): left central occipital scalp Complex Repair And Transposition Flap Text: The defect edges were debeveled with a #15 scalpel blade.  The primary defect was closed partially with a complex linear closure.  Given the location of the remaining defect, shape of the defect and the proximity to free margins a transposition flap was deemed most appropriate for complete closure of the defect.  Using a sterile surgical marker, an appropriate advancement flap was drawn incorporating the defect and placing the expected incisions within the relaxed skin tension lines where possible.    The area thus outlined was incised deep to adipose tissue with a #15 scalpel blade.  The skin margins were undermined to an appropriate distance in all directions utilizing iris scissors. Rhomboid Transposition Flap Text: The defect edges were debeveled with a #15 scalpel blade.  Given the location of the defect and the proximity to free margins a rhomboid transposition flap was deemed most appropriate.  Using a sterile surgical marker, an appropriate rhomboid flap was drawn incorporating the defect.    The area thus outlined was incised deep to adipose tissue with a #15 scalpel blade.  The skin margins were undermined to an appropriate distance in all directions utilizing iris scissors. Complex Repair And A-T Advancement Flap Text: The defect edges were debeveled with a #15 scalpel blade.  The primary defect was closed partially with a complex linear closure.  Given the location of the remaining defect, shape of the defect and the proximity to free margins an A-T advancement flap was deemed most appropriate for complete closure of the defect.  Using a sterile surgical marker, an appropriate advancement flap was drawn incorporating the defect and placing the expected incisions within the relaxed skin tension lines where possible.    The area thus outlined was incised deep to adipose tissue with a #15 scalpel blade.  The skin margins were undermined to an appropriate distance in all directions utilizing iris scissors. O-L Flap Text: The defect edges were debeveled with a #15 scalpel blade.  Given the location of the defect, shape of the defect and the proximity to free margins an O-L flap was deemed most appropriate.  Using a sterile surgical marker, an appropriate advancement flap was drawn incorporating the defect and placing the expected incisions within the relaxed skin tension lines where possible.    The area thus outlined was incised deep to adipose tissue with a #15 scalpel blade.  The skin margins were undermined to an appropriate distance in all directions utilizing iris scissors. Advancement Flap (Double) Text: The defect edges were debeveled with a #15 scalpel blade.  Given the location of the defect and the proximity to free margins a double advancement flap was deemed most appropriate.  Using a sterile surgical marker, the appropriate advancement flaps were drawn incorporating the defect and placing the expected incisions within the relaxed skin tension lines where possible.    The area thus outlined was incised deep to adipose tissue with a #15 scalpel blade.  The skin margins were undermined to an appropriate distance in all directions utilizing iris scissors. Detail Level: Detailed Ear Star Wedge Flap Text: The defect edges were debeveled with a #15 blade scalpel.  Given the location of the defect and the proximity to free margins (helical rim) an ear star wedge flap was deemed most appropriate.  Using a sterile surgical marker, the appropriate flap was drawn incorporating the defect and placing the expected incisions between the helical rim and antihelix where possible.  The area thus outlined was incised through and through with a #15 scalpel blade. Keystone Flap Text: The defect edges were debeveled with a #15 scalpel blade.  Given the location of the defect, shape of the defect a keystone flap was deemed most appropriate.  Using a sterile surgical marker, an appropriate keystone flap was drawn incorporating the defect, outlining the appropriate donor tissue and placing the expected incisions within the relaxed skin tension lines where possible. The area thus outlined was incised deep to adipose tissue with a #15 scalpel blade.  The skin margins were undermined to an appropriate distance in all directions around the primary defect and laterally outward around the flap utilizing iris scissors. Cheek Interpolation Flap Text: A decision was made to reconstruct the defect utilizing an interpolation axial flap and a staged reconstruction.  A telfa template was made of the defect.  This telfa template was then used to outline the Cheek Interpolation flap.  The donor area for the pedicle flap was then injected with anesthesia.  The flap was excised through the skin and subcutaneous tissue down to the layer of the underlying musculature.  The interpolation flap was carefully excised within this deep plane to maintain its blood supply.  The edges of the donor site were undermined.   The donor site was closed in a primary fashion.  The pedicle was then rotated into position and sutured.  Once the tube was sutured into place, adequate blood supply was confirmed with blanching and refill.  The pedicle was then wrapped with xeroform gauze and dressed appropriately with a telfa and gauze bandage to ensure continued blood supply and protect the attached pedicle. Hemostasis: Electrocautery Alar Island Pedicle Flap Text: The defect edges were debeveled with a #15 scalpel blade.  Given the location of the defect, shape of the defect and the proximity to the alar rim an island pedicle advancement flap was deemed most appropriate.  Using a sterile surgical marker, an appropriate advancement flap was drawn incorporating the defect, outlining the appropriate donor tissue and placing the expected incisions within the nasal ala running parallel to the alar rim. The area thus outlined was incised with a #15 scalpel blade.  The skin margins were undermined minimally to an appropriate distance in all directions around the primary defect and laterally outward around the island pedicle utilizing iris scissors.  There was minimal undermining beneath the pedicle flap. Complex Repair And Rhombic Flap Text: The defect edges were debeveled with a #15 scalpel blade.  The primary defect was closed partially with a complex linear closure.  Given the location of the remaining defect, shape of the defect and the proximity to free margins a rhombic flap was deemed most appropriate for complete closure of the defect.  Using a sterile surgical marker, an appropriate advancement flap was drawn incorporating the defect and placing the expected incisions within the relaxed skin tension lines where possible.    The area thus outlined was incised deep to adipose tissue with a #15 scalpel blade.  The skin margins were undermined to an appropriate distance in all directions utilizing iris scissors. Repair Performed By Another Provider Text (Leave Blank If You Do Not Want): After the tissue was excised the defect was repaired by another provider. Banner Transposition Flap Text: The defect edges were debeveled with a #15 scalpel blade.  Given the location of the defect and the proximity to free margins a Banner transposition flap was deemed most appropriate.  Using a sterile surgical marker, an appropriate flap drawn around the defect. The area thus outlined was incised deep to adipose tissue with a #15 scalpel blade.  The skin margins were undermined to an appropriate distance in all directions utilizing iris scissors. Rotation Flap Text: The defect edges were debeveled with a #15 scalpel blade.  Given the location of the defect, shape of the defect and the proximity to free margins a rotation flap was deemed most appropriate.  Using a sterile surgical marker, an appropriate rotation flap was drawn incorporating the defect and placing the expected incisions within the relaxed skin tension lines where possible.    The area thus outlined was incised deep to adipose tissue with a #15 scalpel blade.  The skin margins were undermined to an appropriate distance in all directions utilizing iris scissors. Tissue Cultured Epidermal Autograft Text: The defect edges were debeveled with a #15 scalpel blade.  Given the location of the defect, shape of the defect and the proximity to free margins a tissue cultured epidermal autograft was deemed most appropriate.  The graft was then trimmed to fit the size of the defect.  The graft was then placed in the primary defect and oriented appropriately. Purse String (Intermediate) Text: Given the location of the defect and the characteristics of the surrounding skin a purse string intermediate closure was deemed most appropriate.  Undermining was performed circumfirentially around the surgical defect.  A purse string suture was then placed and tightened. Mastoid Interpolation Flap Text: A decision was made to reconstruct the defect utilizing an interpolation axial flap and a staged reconstruction.  A telfa template was made of the defect.  This telfa template was then used to outline the mastoid interpolation flap.  The donor area for the pedicle flap was then injected with anesthesia.  The flap was excised through the skin and subcutaneous tissue down to the layer of the underlying musculature.  The pedicle flap was carefully excised within this deep plane to maintain its blood supply.  The edges of the donor site were undermined.   The donor site was closed in a primary fashion.  The pedicle was then rotated into position and sutured.  Once the tube was sutured into place, adequate blood supply was confirmed with blanching and refill.  The pedicle was then wrapped with xeroform gauze and dressed appropriately with a telfa and gauze bandage to ensure continued blood supply and protect the attached pedicle. Suture Removal: 10 days

## 2019-08-13 ENCOUNTER — APPOINTMENT (OUTPATIENT)
Dept: HEMATOLOGY ONCOLOGY | Facility: CLINIC | Age: 24
End: 2019-08-13

## 2022-09-30 NOTE — PROVIDER CONTACT NOTE (CRITICAL VALUE NOTIFICATION) - SITUATION
Please call the office when you leave to schedule an appointment for 2 weeks  This can be a virtual / telephone consultation or it can be in person  The choice is yours  Please call [phone]   [address], [id number], [id number]  [address] between [facility] and [facility]  Activity:    May lift 10 lb as many times as desired the 1st week,       20 lb in 2 weeks,       30 lb in 3 weeks  Walking is encouraged  Normal daily activities including climbing steps are okay  Do not engage in strenuous activity ( sit-ups or crutches) or contact sports for 4-6 weeks post-operatively    Return to Work:   Okay to return to work when you feel well if you desire  Diet:   You may return to your normal healthy diet  Wound Care: Your wound is closed with dissolvable stitches and glue  It is okay to shower  Wash incision gently with soap and water and pat dry  Do not soak incisions in bath water or swim for two weeks  Do not apply any creams or ointments  Pain Medication:   Please take as directed if needed  May use Advil or Motrin in addition  Recall, the pain medicine and anesthesia is associated with constipation  No driving while taking narcotic pain medications  Other: It is normal to developed a “healing ridge” / firm incision after surgery  This is your body making scar tissue  It is a good sign  Constipation is very common after general anesthesia  Please use milk of magnesia as needed in order to help prevent constipation  It is normal to get bruising after surgery  If you have questions after discharge please call the office      If you have increased pain, fever >101 5, increased drainage, redness or a bad smell at your surgery site, please call us immediately or come directly to the Emergency Room
There are no reportable values for the CBC from 12/23/17 except WBC 15.8,  Hgb 6.6 Plt 288 Ret 768

## 2024-10-14 NOTE — PATIENT PROFILE ADULT. - URINARY CATHETER
[facility] Rooming Note    Patient was identified appropriately by name and date of birth.    Medication Reconciliation: complete    Chief complaint: follow up for DIDIER      Height: 71 ft/inches  Weight: 236 lbs  SpO2: 98  HR: 57  BP: 159/90  Neck circumference: 17 inches     [place] Sleepiness Scale    How likely are you to doze off or fall asleep in the following situations, in contrast to just feeling tired?    This refers to your usual way of life recently.    Even if you haven't done some of these things recently, try to figure out how they would have affected you.    Use the following scale to choose the most appropriate number for each situation:  0 = NO CHANCE of dozing  1 = SLIGHT CHANCE of dozing  2 = MODERATE CHANCE of dozing  3 = HIGH CHANCE of dozing    Sitting and Readin  Watching television: 1  Sitting inactive in a public place:0  Riding in car:0  Laying down to rest in the afternoon:3  Sitting and talking to someone:0  Sitting quietly after lunch without alcohol:0  In a car, stopped in traffic for a few minutes:0    Score: 5    DME needs: ***    Additional Notes: ***      Answers submitted by the patient for this visit:  General Questionnaire (Submitted on 10/9/2024)  Chief Complaint: Chronic problems general questions HPI Form  What is the reason for your visit today? : Follow-up to determine results of use of mandibular adjustment device for improving sleep apnea.  How many servings of fruits and vegetables do you eat daily?: 4 or more  On average, how many sweetened beverages do you drink each day (Examples: soda, juice, sweet tea, etc.  Do NOT count diet or artificially sweetened beverages)?: 0  How many minutes a day do you exercise enough to make your heart beat faster?: 30 to 60  How many days a week do you exercise enough to make your heart beat faster?: 5  How many days per week do you miss taking your medication?: 0     no

## 2024-11-11 NOTE — ED ADULT NURSE NOTE - INTEGUMENTARY WDL
0 (no pain/absence of nonverbal indicators of pain) Color consistent with ethnicity/race, warm, dry intact, resilient.